# Patient Record
Sex: MALE | Race: WHITE | NOT HISPANIC OR LATINO | URBAN - METROPOLITAN AREA
[De-identification: names, ages, dates, MRNs, and addresses within clinical notes are randomized per-mention and may not be internally consistent; named-entity substitution may affect disease eponyms.]

---

## 2019-01-01 ENCOUNTER — EMERGENCY (EMERGENCY)
Facility: HOSPITAL | Age: 79
LOS: 1 days | Discharge: HOME | End: 2019-01-01
Attending: EMERGENCY MEDICINE
Payer: MEDICARE

## 2019-01-01 VITALS — HEIGHT: 69 IN | WEIGHT: 205.03 LBS

## 2019-01-01 VITALS
SYSTOLIC BLOOD PRESSURE: 124 MMHG | TEMPERATURE: 98 F | RESPIRATION RATE: 19 BRPM | HEART RATE: 74 BPM | DIASTOLIC BLOOD PRESSURE: 85 MMHG | OXYGEN SATURATION: 93 %

## 2019-01-01 DIAGNOSIS — Z90.49 ACQUIRED ABSENCE OF OTHER SPECIFIED PARTS OF DIGESTIVE TRACT: Chronic | ICD-10-CM

## 2019-01-01 DIAGNOSIS — Z98.890 OTHER SPECIFIED POSTPROCEDURAL STATES: Chronic | ICD-10-CM

## 2019-01-01 LAB
ALBUMIN SERPL ELPH-MCNC: 3.5 G/DL — SIGNIFICANT CHANGE UP (ref 3.3–5.2)
ALP SERPL-CCNC: 98 U/L — SIGNIFICANT CHANGE UP (ref 40–120)
ALT FLD-CCNC: 10 U/L — SIGNIFICANT CHANGE UP
ANION GAP SERPL CALC-SCNC: 12 MMOL/L — SIGNIFICANT CHANGE UP (ref 5–17)
APPEARANCE UR: CLEAR — SIGNIFICANT CHANGE UP
AST SERPL-CCNC: 14 U/L — SIGNIFICANT CHANGE UP
BACTERIA # UR AUTO: NEGATIVE — SIGNIFICANT CHANGE UP
BASOPHILS # BLD AUTO: 0.07 K/UL — SIGNIFICANT CHANGE UP (ref 0–0.2)
BASOPHILS NFR BLD AUTO: 0.6 % — SIGNIFICANT CHANGE UP (ref 0–2)
BILIRUB SERPL-MCNC: 0.5 MG/DL — SIGNIFICANT CHANGE UP (ref 0.4–2)
BILIRUB UR-MCNC: NEGATIVE — SIGNIFICANT CHANGE UP
BUN SERPL-MCNC: 19 MG/DL — SIGNIFICANT CHANGE UP (ref 8–20)
CALCIUM SERPL-MCNC: 8.6 MG/DL — SIGNIFICANT CHANGE UP (ref 8.6–10.2)
CHLORIDE SERPL-SCNC: 100 MMOL/L — SIGNIFICANT CHANGE UP (ref 98–107)
CO2 SERPL-SCNC: 25 MMOL/L — SIGNIFICANT CHANGE UP (ref 22–29)
COLOR SPEC: YELLOW — SIGNIFICANT CHANGE UP
CREAT SERPL-MCNC: 0.97 MG/DL — SIGNIFICANT CHANGE UP (ref 0.5–1.3)
CULTURE RESULTS: SIGNIFICANT CHANGE UP
DIFF PNL FLD: ABNORMAL
EOSINOPHIL # BLD AUTO: 0.13 K/UL — SIGNIFICANT CHANGE UP (ref 0–0.5)
EOSINOPHIL NFR BLD AUTO: 1.2 % — SIGNIFICANT CHANGE UP (ref 0–6)
EPI CELLS # UR: NEGATIVE — SIGNIFICANT CHANGE UP
GLUCOSE SERPL-MCNC: 279 MG/DL — HIGH (ref 70–115)
GLUCOSE UR QL: 1000 MG/DL
HCT VFR BLD CALC: 47.2 % — SIGNIFICANT CHANGE UP (ref 39–50)
HGB BLD-MCNC: 15.3 G/DL — SIGNIFICANT CHANGE UP (ref 13–17)
IMM GRANULOCYTES NFR BLD AUTO: 0.4 % — SIGNIFICANT CHANGE UP (ref 0–1.5)
KETONES UR-MCNC: ABNORMAL
LEUKOCYTE ESTERASE UR-ACNC: NEGATIVE — SIGNIFICANT CHANGE UP
LYMPHOCYTES # BLD AUTO: 1.14 K/UL — SIGNIFICANT CHANGE UP (ref 1–3.3)
LYMPHOCYTES # BLD AUTO: 10.3 % — LOW (ref 13–44)
MCHC RBC-ENTMCNC: 28.8 PG — SIGNIFICANT CHANGE UP (ref 27–34)
MCHC RBC-ENTMCNC: 32.4 GM/DL — SIGNIFICANT CHANGE UP (ref 32–36)
MCV RBC AUTO: 88.9 FL — SIGNIFICANT CHANGE UP (ref 80–100)
MONOCYTES # BLD AUTO: 0.91 K/UL — HIGH (ref 0–0.9)
MONOCYTES NFR BLD AUTO: 8.2 % — SIGNIFICANT CHANGE UP (ref 2–14)
NEUTROPHILS # BLD AUTO: 8.82 K/UL — HIGH (ref 1.8–7.4)
NEUTROPHILS NFR BLD AUTO: 79.3 % — HIGH (ref 43–77)
NITRITE UR-MCNC: NEGATIVE — SIGNIFICANT CHANGE UP
PH UR: 5 — SIGNIFICANT CHANGE UP (ref 5–8)
PLATELET # BLD AUTO: 173 K/UL — SIGNIFICANT CHANGE UP (ref 150–400)
POTASSIUM SERPL-MCNC: 4.2 MMOL/L — SIGNIFICANT CHANGE UP (ref 3.5–5.3)
POTASSIUM SERPL-SCNC: 4.2 MMOL/L — SIGNIFICANT CHANGE UP (ref 3.5–5.3)
PROT SERPL-MCNC: 6.5 G/DL — LOW (ref 6.6–8.7)
PROT UR-MCNC: 500 MG/DL
RBC # BLD: 5.31 M/UL — SIGNIFICANT CHANGE UP (ref 4.2–5.8)
RBC # FLD: 13.6 % — SIGNIFICANT CHANGE UP (ref 10.3–14.5)
RBC CASTS # UR COMP ASSIST: SIGNIFICANT CHANGE UP /HPF (ref 0–4)
SODIUM SERPL-SCNC: 137 MMOL/L — SIGNIFICANT CHANGE UP (ref 135–145)
SP GR SPEC: 1.02 — SIGNIFICANT CHANGE UP (ref 1.01–1.02)
SPECIMEN SOURCE: SIGNIFICANT CHANGE UP
UROBILINOGEN FLD QL: NEGATIVE MG/DL — SIGNIFICANT CHANGE UP
WBC # BLD: 11.11 K/UL — HIGH (ref 3.8–10.5)
WBC # FLD AUTO: 11.11 K/UL — HIGH (ref 3.8–10.5)
WBC UR QL: SIGNIFICANT CHANGE UP

## 2019-01-01 PROCEDURE — 70450 CT HEAD/BRAIN W/O DYE: CPT | Mod: 26

## 2019-01-01 PROCEDURE — 93010 ELECTROCARDIOGRAM REPORT: CPT

## 2019-01-01 PROCEDURE — 81001 URINALYSIS AUTO W/SCOPE: CPT

## 2019-01-01 PROCEDURE — 70450 CT HEAD/BRAIN W/O DYE: CPT

## 2019-01-01 PROCEDURE — 80053 COMPREHEN METABOLIC PANEL: CPT

## 2019-01-01 PROCEDURE — 36415 COLL VENOUS BLD VENIPUNCTURE: CPT

## 2019-01-01 PROCEDURE — 99284 EMERGENCY DEPT VISIT MOD MDM: CPT

## 2019-01-01 PROCEDURE — 85027 COMPLETE CBC AUTOMATED: CPT

## 2019-01-01 PROCEDURE — 99284 EMERGENCY DEPT VISIT MOD MDM: CPT | Mod: 25

## 2019-01-01 PROCEDURE — 93005 ELECTROCARDIOGRAM TRACING: CPT

## 2019-01-01 PROCEDURE — 87086 URINE CULTURE/COLONY COUNT: CPT

## 2019-07-03 ENCOUNTER — INPATIENT (INPATIENT)
Facility: HOSPITAL | Age: 79
LOS: 0 days | Discharge: HOME | DRG: 309 | End: 2019-07-04
Attending: STUDENT IN AN ORGANIZED HEALTH CARE EDUCATION/TRAINING PROGRAM | Admitting: STUDENT IN AN ORGANIZED HEALTH CARE EDUCATION/TRAINING PROGRAM
Payer: MEDICARE

## 2019-07-03 VITALS
SYSTOLIC BLOOD PRESSURE: 94 MMHG | WEIGHT: 199.96 LBS | HEIGHT: 68 IN | RESPIRATION RATE: 18 BRPM | HEART RATE: 76 BPM | DIASTOLIC BLOOD PRESSURE: 64 MMHG | OXYGEN SATURATION: 94 %

## 2019-07-03 DIAGNOSIS — N17.9 ACUTE KIDNEY FAILURE, UNSPECIFIED: ICD-10-CM

## 2019-07-03 DIAGNOSIS — D72.829 ELEVATED WHITE BLOOD CELL COUNT, UNSPECIFIED: ICD-10-CM

## 2019-07-03 DIAGNOSIS — I48.4 ATYPICAL ATRIAL FLUTTER: ICD-10-CM

## 2019-07-03 DIAGNOSIS — I49.9 CARDIAC ARRHYTHMIA, UNSPECIFIED: ICD-10-CM

## 2019-07-03 DIAGNOSIS — F41.9 ANXIETY DISORDER, UNSPECIFIED: ICD-10-CM

## 2019-07-03 DIAGNOSIS — I25.10 ATHEROSCLEROTIC HEART DISEASE OF NATIVE CORONARY ARTERY WITHOUT ANGINA PECTORIS: ICD-10-CM

## 2019-07-03 DIAGNOSIS — I10 ESSENTIAL (PRIMARY) HYPERTENSION: ICD-10-CM

## 2019-07-03 DIAGNOSIS — E11.9 TYPE 2 DIABETES MELLITUS WITHOUT COMPLICATIONS: ICD-10-CM

## 2019-07-03 DIAGNOSIS — E78.5 HYPERLIPIDEMIA, UNSPECIFIED: ICD-10-CM

## 2019-07-03 DIAGNOSIS — Z29.9 ENCOUNTER FOR PROPHYLACTIC MEASURES, UNSPECIFIED: ICD-10-CM

## 2019-07-03 LAB
ALBUMIN SERPL ELPH-MCNC: 3.3 G/DL — SIGNIFICANT CHANGE UP (ref 3.3–5)
ALP SERPL-CCNC: 103 U/L — SIGNIFICANT CHANGE UP (ref 40–120)
ALT FLD-CCNC: 18 U/L — SIGNIFICANT CHANGE UP (ref 12–78)
ANION GAP SERPL CALC-SCNC: 4 MMOL/L — LOW (ref 5–17)
APTT BLD: 30.7 SEC — SIGNIFICANT CHANGE UP (ref 27.5–36.3)
AST SERPL-CCNC: 12 U/L — LOW (ref 15–37)
BILIRUB SERPL-MCNC: 0.6 MG/DL — SIGNIFICANT CHANGE UP (ref 0.2–1.2)
BUN SERPL-MCNC: 22 MG/DL — SIGNIFICANT CHANGE UP (ref 7–23)
CALCIUM SERPL-MCNC: 8.5 MG/DL — SIGNIFICANT CHANGE UP (ref 8.5–10.1)
CHLORIDE SERPL-SCNC: 107 MMOL/L — SIGNIFICANT CHANGE UP (ref 96–108)
CO2 SERPL-SCNC: 27 MMOL/L — SIGNIFICANT CHANGE UP (ref 22–31)
CREAT SERPL-MCNC: 1.4 MG/DL — HIGH (ref 0.5–1.3)
GLUCOSE SERPL-MCNC: 258 MG/DL — HIGH (ref 70–99)
HCT VFR BLD CALC: 51.3 % — HIGH (ref 39–50)
HGB BLD-MCNC: 16.9 G/DL — SIGNIFICANT CHANGE UP (ref 13–17)
INR BLD: 1.18 RATIO — HIGH (ref 0.88–1.16)
MAGNESIUM SERPL-MCNC: 2.1 MG/DL — SIGNIFICANT CHANGE UP (ref 1.6–2.6)
MCHC RBC-ENTMCNC: 29.2 PG — SIGNIFICANT CHANGE UP (ref 27–34)
MCHC RBC-ENTMCNC: 32.9 GM/DL — SIGNIFICANT CHANGE UP (ref 32–36)
MCV RBC AUTO: 88.6 FL — SIGNIFICANT CHANGE UP (ref 80–100)
NRBC # BLD: 0 /100 WBCS — SIGNIFICANT CHANGE UP (ref 0–0)
PLATELET # BLD AUTO: 195 K/UL — SIGNIFICANT CHANGE UP (ref 150–400)
POTASSIUM SERPL-MCNC: 4.3 MMOL/L — SIGNIFICANT CHANGE UP (ref 3.5–5.3)
POTASSIUM SERPL-SCNC: 4.3 MMOL/L — SIGNIFICANT CHANGE UP (ref 3.5–5.3)
PROT SERPL-MCNC: 6.9 G/DL — SIGNIFICANT CHANGE UP (ref 6–8.3)
PROTHROM AB SERPL-ACNC: 13.4 SEC — HIGH (ref 10–12.9)
RBC # BLD: 5.79 M/UL — SIGNIFICANT CHANGE UP (ref 4.2–5.8)
RBC # FLD: 14.4 % — SIGNIFICANT CHANGE UP (ref 10.3–14.5)
SODIUM SERPL-SCNC: 138 MMOL/L — SIGNIFICANT CHANGE UP (ref 135–145)
TROPONIN I SERPL-MCNC: <.015 NG/ML — SIGNIFICANT CHANGE UP (ref 0.01–0.04)
WBC # BLD: 14.83 K/UL — HIGH (ref 3.8–10.5)
WBC # FLD AUTO: 14.83 K/UL — HIGH (ref 3.8–10.5)

## 2019-07-03 PROCEDURE — 99223 1ST HOSP IP/OBS HIGH 75: CPT | Mod: GC,AI

## 2019-07-03 PROCEDURE — 93010 ELECTROCARDIOGRAM REPORT: CPT

## 2019-07-03 PROCEDURE — 71045 X-RAY EXAM CHEST 1 VIEW: CPT | Mod: 26

## 2019-07-03 PROCEDURE — 99223 1ST HOSP IP/OBS HIGH 75: CPT

## 2019-07-03 PROCEDURE — 99285 EMERGENCY DEPT VISIT HI MDM: CPT

## 2019-07-03 RX ORDER — DEXTROSE 50 % IN WATER 50 %
12.5 SYRINGE (ML) INTRAVENOUS ONCE
Refills: 0 | Status: DISCONTINUED | OUTPATIENT
Start: 2019-07-03 | End: 2019-07-04

## 2019-07-03 RX ORDER — DEXTROSE 50 % IN WATER 50 %
15 SYRINGE (ML) INTRAVENOUS ONCE
Refills: 0 | Status: DISCONTINUED | OUTPATIENT
Start: 2019-07-03 | End: 2019-07-04

## 2019-07-03 RX ORDER — AMLODIPINE BESYLATE 2.5 MG/1
10 TABLET ORAL DAILY
Refills: 0 | Status: DISCONTINUED | OUTPATIENT
Start: 2019-07-03 | End: 2019-07-04

## 2019-07-03 RX ORDER — ENOXAPARIN SODIUM 100 MG/ML
90 INJECTION SUBCUTANEOUS
Refills: 0 | Status: DISCONTINUED | OUTPATIENT
Start: 2019-07-03 | End: 2019-07-04

## 2019-07-03 RX ORDER — CLOPIDOGREL BISULFATE 75 MG/1
75 TABLET, FILM COATED ORAL DAILY
Refills: 0 | Status: DISCONTINUED | OUTPATIENT
Start: 2019-07-04 | End: 2019-07-04

## 2019-07-03 RX ORDER — GLUCAGON INJECTION, SOLUTION 0.5 MG/.1ML
1 INJECTION, SOLUTION SUBCUTANEOUS ONCE
Refills: 0 | Status: DISCONTINUED | OUTPATIENT
Start: 2019-07-03 | End: 2019-07-04

## 2019-07-03 RX ORDER — INSULIN LISPRO 100/ML
VIAL (ML) SUBCUTANEOUS
Refills: 0 | Status: DISCONTINUED | OUTPATIENT
Start: 2019-07-03 | End: 2019-07-04

## 2019-07-03 RX ORDER — ATORVASTATIN CALCIUM 80 MG/1
20 TABLET, FILM COATED ORAL AT BEDTIME
Refills: 0 | Status: DISCONTINUED | OUTPATIENT
Start: 2019-07-03 | End: 2019-07-04

## 2019-07-03 RX ORDER — INSULIN GLARGINE 100 [IU]/ML
40 INJECTION, SOLUTION SUBCUTANEOUS AT BEDTIME
Refills: 0 | Status: DISCONTINUED | OUTPATIENT
Start: 2019-07-03 | End: 2019-07-04

## 2019-07-03 RX ORDER — DEXTROSE 50 % IN WATER 50 %
25 SYRINGE (ML) INTRAVENOUS ONCE
Refills: 0 | Status: DISCONTINUED | OUTPATIENT
Start: 2019-07-03 | End: 2019-07-04

## 2019-07-03 RX ORDER — SODIUM CHLORIDE 9 MG/ML
1000 INJECTION INTRAMUSCULAR; INTRAVENOUS; SUBCUTANEOUS ONCE
Refills: 0 | Status: COMPLETED | OUTPATIENT
Start: 2019-07-03 | End: 2019-07-03

## 2019-07-03 RX ORDER — LANOLIN ALCOHOL/MO/W.PET/CERES
3 CREAM (GRAM) TOPICAL AT BEDTIME
Refills: 0 | Status: DISCONTINUED | OUTPATIENT
Start: 2019-07-03 | End: 2019-07-04

## 2019-07-03 RX ORDER — INSULIN LISPRO 100/ML
VIAL (ML) SUBCUTANEOUS AT BEDTIME
Refills: 0 | Status: DISCONTINUED | OUTPATIENT
Start: 2019-07-03 | End: 2019-07-04

## 2019-07-03 RX ORDER — SODIUM CHLORIDE 9 MG/ML
1000 INJECTION, SOLUTION INTRAVENOUS
Refills: 0 | Status: DISCONTINUED | OUTPATIENT
Start: 2019-07-03 | End: 2019-07-04

## 2019-07-03 RX ADMIN — ENOXAPARIN SODIUM 90 MILLIGRAM(S): 100 INJECTION SUBCUTANEOUS at 22:36

## 2019-07-03 RX ADMIN — SODIUM CHLORIDE 1000 MILLILITER(S): 9 INJECTION INTRAMUSCULAR; INTRAVENOUS; SUBCUTANEOUS at 18:00

## 2019-07-03 RX ADMIN — AMLODIPINE BESYLATE 10 MILLIGRAM(S): 2.5 TABLET ORAL at 22:28

## 2019-07-03 RX ADMIN — ATORVASTATIN CALCIUM 20 MILLIGRAM(S): 80 TABLET, FILM COATED ORAL at 22:29

## 2019-07-03 RX ADMIN — SODIUM CHLORIDE 1000 MILLILITER(S): 9 INJECTION INTRAMUSCULAR; INTRAVENOUS; SUBCUTANEOUS at 17:00

## 2019-07-03 NOTE — ED PROVIDER NOTE - CONSTITUTIONAL, MLM
normal... Well appearing, elderly white male well nourished, awake, alert, oriented to person, place, time/situation and in no apparent distress.

## 2019-07-03 NOTE — H&P ADULT - PROBLEM SELECTOR PLAN 4
-Continue home medications Losartan 50 and HTZC 25   JUSTIN.... -Home Lantus 80 at bedtime and humalog 5 TID premeal   -Will start Lantus 40 at bedtime, low dose ISS, hypoglymcemia protocol for now -Home Lantus 80 at bedtime and Humalog 5 TID premeal   -Will start Lantus 40 at bedtime, low dose ISS, hypoglycemia protocol for now

## 2019-07-03 NOTE — ED ADULT NURSE NOTE - OBJECTIVE STATEMENT
78 year old male brought in by EMS status post syncope. Patient staying in a hotel nearby for a wedding tonight. Patient reports he has had diet changes. Today he was having a bowel movement, more than normal, and experienced a vasovagal episode. Patient states he was sitting on the toilet and did not fall or hit his head. Patient ambulated to the [facility] where his family was waiting and he had a second episode, this time near syncopal. Family called EMS. On arrival to ED patient is alert and oriented. Patient following commands appropriately. Patient denies chest pain, shortness of breath or palpitations. Patient denies headache, dizziness or blurred vision. Patient denies abdominal pain, nausea/vomiting. Upon arrival to ED EKG obtained. Patient to be found in Atrial Flutter with no known history of an irregular heart rhythm. Patient reports leading up to today he felt his normal self.

## 2019-07-03 NOTE — CHART NOTE - NSCHARTNOTEFT_GEN_A_CORE
Search Terms: Saw Tate, 1940     Search Date: 07/03/2019 08:54:58 PM     The Drug Utilization Report below displays all of the controlled substance prescriptions, if any, that your patient has filled in the last twelve months. The information displayed on this report is compiled from pharmacy submissions to the Department, and accurately reflects the information as submitted by the pharmacies.    This report was requested by: Shon Mueller | Reference #: [id number]             Others' Prescriptions    Patient Name: Ronan Tate YOB: 1940   Address: [address] [place] [address] Sex: Male         Rx Written    Rx Dispensed    Drug    Quantity    Days Supply    Prescriber Name              06/25/2019 06/29/2019 diazepam 5 mg tablet  30 30 Balot, Benedict H DO     05/30/2019 06/03/2019 diazepam 5 mg tablet  30 30 Balot, Benedict H DO     04/26/2019 04/29/2019 diazepam 5 mg tablet  30 30 Balot, Benedict H DO     03/25/2019 03/28/2019 diazepam 5 mg tablet  30 30 Balot, Benedict H DO     02/21/2019 02/22/2019 diazepam 5 mg tablet  30 30 Balot, Benedict H DO     01/17/2019 01/22/2019 diazepam 5 mg tablet  30 30 Balot, Benedict H DO     11/30/2018 12/05/2018 diazepam 5 mg tablet  30 30 Balot, Benedict H DO     10/26/2018 11/03/2018 diazepam 5 mg tablet  30 30 Balot, Benedict H DO     09/27/2018 10/05/2018 diazepam 5 mg tablet  30 30 Balot, Benedict H DO     07/27/2018 08/01/2018 diazepam 5 mg tablet  30 30 Balot, Benedict H DO

## 2019-07-03 NOTE — CONSULT NOTE ADULT - ASSESSMENT
Mr. Tate is a 78 year old male with CAD, HTN and DM2, here with an episode of weakness and near syncope, after going to the bathroom. He blames all of this on eating a lot of fruit, dehydration and diarrhea.  He reports a history of CAD, and had an atherectomy about 6 years ago, though no stents. He is currently on Plavix now, though cannot recall any of his other medications.  He has no history of atrial fibrillation or flutter, and has not been on anticoagulation.    Upon presentation today, he is in a coarse atrial fibrillation vs atrial flutter with generally slow rates. The QRS morphology also has an intermittent LBBB.    - It is unclear to me if his nichelle-atrial arrythmia is related to his symptoms or coincidental. His labs do suggest dehydration so the event could be vagal in the setting of this.  - That being said, he should be admitted to watch on telemetry overnight.  - Check echocardiogram  - A medication list shows he has been on Toprol XL, though this was from 2013. If he remains on av nodals, they should be held.  - In the setting of this new AF/flutter, we had a long discussion regarding a/c. His CHADS2-vasc is elevated, and he should be on eliquis. He does not want to start this without speaking to his cardiologist. He also has been wobbly on his feet, so the risk of fall needs to be evaluated. For now, we can put him on full dose Lovenox until a decision has been made.  - No sign of acute ischemia. His troponin is negative  - No sign of congestive heart failure or volume overload  - Watch creatinine and electrolytes. Keep K>4, mg>2  - Will follow with you while admitted.

## 2019-07-03 NOTE — ED PROVIDER NOTE - CARE PLAN
Principal Discharge DX:	Atypical atrial flutter  Secondary Diagnosis:	Syncope, unspecified syncope type

## 2019-07-03 NOTE — H&P ADULT - PROBLEM SELECTOR PLAN 5
-atherectomy about 6 years ago   -continue home medication Plavix -Continue home medications Losartan 50 and HTZC 25   JUSTIN.... -Hold home medications Losartan 50 and HTZC 25 in setting of JUSTIN with unknown baseline   -Will start amlodipine 10 daily first dose now

## 2019-07-03 NOTE — H&P ADULT - ATTENDING COMMENTS
I personally conducted a physical examination of the patient. I personally gathered the patient's history. I edited the above listed findings which were prepared by the listed resident physician. I personally discussed the plan of care with the patient. The questions and concerns were addressed to the best of my ability. The patient is in agreement with the listed treatment plan.     - plan as above. hold all aaron blocking agents as pt is nichelle w/ new onset aflutter. therapeutic AC. pt is not receptive to continuing long term AC but agreeable w/ injection for now

## 2019-07-03 NOTE — H&P ADULT - PROBLEM SELECTOR PLAN 3
-Home Lantus 80 at bedtime and humalog 5 TID premeal   -Will start Lantus 40 at bedtime, low dose ISS, hypoglymcemia protocol for now -Likely reactive in nature, patient with episode of loose stool this AM prior to admission, but no more loose stools, unlikely infectious  -afebrile   -F/u AM CBC -Likely reactive in nature, patient with episode of loose stool this AM prior to admission, but no more loose stools, unlikely infectious  -afebrile, benign abdominal exam   -F/u AM CBC -Likely reactive in nature, patient with episode of loose stool this AM prior to admission, but no more loose stools, unlikely infectious  -afebrile, benign abdominal exam  -F/u AM CBC

## 2019-07-03 NOTE — H&P ADULT - HISTORY OF PRESENT ILLNESS
78 year old male who is a poor historian with a past medical history of CAD previous atherectomy about 6 years ago, diabetes, HTN, presented to the ED from [facility] with an episode of weakness, fatigue and near syncope?, after going to the bathroom. .No family at bedside at time of history. History obtained from chart and patient. Patient states that he has  recently been on a high fruit diet and was in his normal state of health this morning. This afternoon while at the [facility] he went to the mens room and had a massive amount of diarrhea. Immediately following the diarrheal episode he felt very weak and tired. He managed to walk out of the bathroom and took rest on a nearby couch. He continued to defecate when on the couch because he was too weak to get back to the bathroom. [facility] staff called an ambulance and he was brought to the [place] ED. The patient denies losing consciousness, denies chest pain, SOB, headaches, fevers, chills, diarrhea before today, palpitations headaches, focal weakness, or any other symptoms.      ED Vitals: T: 97.6, HR: 55, BP: 94/64 then 171/79, RR: 16, 100 RA   Labs significant for:   WBC: 14.83, INR: 1.18, Cr: 1.4, glucose: 258, first trop negative     In the Ed patient was given: IVF bolus X1 78 year old male who is a poor historian with a past medical history of CAD previous atherectomy about 6 years ago, diabetes, HTN, presented to the ED from [facility] with an episode of weakness, fatigue and near syncope?, after going to the bathroom. .No family at bedside at time of history. History obtained from chart and patient. Patient states that he has  recently been on a high fruit diet and was in his normal state of health this morning. This afternoon while at the [facility] he went to the mens room and had a massive amount of diarrhea. Immediately following the diarrheal episode he felt very weak and tired. He managed to walk out of the bathroom and took rest on a nearby couch. He continued to defecate when on the couch because he was too weak to get back to the bathroom. [facility] staff called an ambulance and he was brought to the [place] ED. The patient denies losing consciousness, denies chest pain, SOB, headaches, fevers, chills, diarrhea before today, palpitations headaches, focal weakness, or any other symptoms.      ED Vitals: T: 97.6, HR: 55, BP: 94/64 then 171/79, RR: 16, 100 RA   Labs significant for:   WBC: 14.83, INR: 1.18, Cr: 1.4, glucose: 258, first trop negative     In the Ed patient was given: IVF bolus X1   Upon presentation to ED, patient in coarse atrial fibrillation vs atrial flutter with generally slow rates. The QRS morphology also has an intermittent LBBB. 78 year old male who is a poor historian with a past medical history of CAD previous atherectomy about 6 years ago, diabetes, HTN, presented to the ED from [facility] with an episode of weakness, fatigue and near syncope?, after going to the bathroom. No family at bedside at time of history. History obtained from chart and patient. Patient states that he has recently been on a high fruit diet and was in his normal state of health this morning. This afternoon he went to the mens room and had a "massive" amount of diarrhea. Immediately following the diarrheal episode he felt very weak and tired. He managed to walk out of the bathroom and took rest on a nearby couch. He continued to defecate when on the couch because he was too weak to get back to the bathroom. [facility] staff called an ambulance and he was brought to the [place] ED. The patient denies losing consciousness, denies chest pain, SOB, headaches, fevers, chills, diarrhea before today, palpitations headaches, focal weakness, or any other symptoms. At the time of admission, the pt was quite dismissive of his symptoms and he feels that admission is unnecessary.     ED Vitals: T: 97.6, HR: 55, BP: 94/64 then 171/79, RR: 16, 100 RA   Labs significant for:   WBC: 14.83, INR: 1.18, Cr: 1.4, glucose: 258, first trop negative     In the Ed patient was given: IVF bolus X1   Upon presentation to ED, patient in coarse atrial fibrillation vs atrial flutter with generally slow rates. The QRS morphology also has an intermittent LBBB.

## 2019-07-03 NOTE — ED PROVIDER NOTE - CLINICAL SUMMARY MEDICAL DECISION MAKING FREE TEXT BOX
Elderly white male with syncope post diarrhea now in A. Flutter requiring evaluation, labs, ecg and cardiology consultation.

## 2019-07-03 NOTE — ED ADULT NURSE NOTE - NSIMPLEMENTINTERV_GEN_ALL_ED
Implemented All Fall with Harm Risk Interventions:  [place] to call system. Call bell, personal items and telephone within reach. Instruct patient to call for assistance. Room bathroom lighting operational. Non-slip footwear when patient is off stretcher. Physically safe environment: no spills, clutter or unnecessary equipment. Stretcher in lowest position, wheels locked, appropriate side rails in place. Provide visual cue, wrist band, yellow gown, etc. Monitor gait and stability. Monitor for mental status changes and reorient to person, place, and time. Review medications for side effects contributing to fall risk. Reinforce activity limits and safety measures with patient and family. Provide visual clues: red socks.

## 2019-07-03 NOTE — H&P ADULT - PROBLEM SELECTOR PLAN 1
-Patient presented with weakness, ?near syncope, after using the bathroom   -coarse atrial fibrillation vs atrial flutter with generally slow rates, QRS morphology also has an intermittent LBBB on presentation  -Admit to Tele for monitoring   -Seen by Cardio (Dr. Boles) in the ED   -Will start full dose Lovenox for now  -Home metoprolol succinate ER 50?________ -Patient presented with weakness, ?near syncope, after using the bathroom   -coarse atrial fibrillation vs atrial flutter with generally slow rates, QRS morphology also has an intermittent LBBB on presentation  -Admit to Tele for monitoring   -echo ordered, f/u results   -Seen by Cardio (Dr. Boles) in the ED   -Will start full dose Lovenox for now  -Home metoprolol succinate ER 50?________ -Patient presented with weakness, ?near syncope, after using the bathroom   -coarse atrial fibrillation vs atrial flutter with generally slow rates, QRS morphology also has an intermittent LBBB on presentation  -Admit to Tele for monitoring   -First troponin negative  -echo ordered, f/u results   -Seen by Cardio (Dr. Boles) in the ED   -Will start full dose Lovenox for now  -Holding home metoprolol succinate ER 50 -Patient presented with weakness, ?near syncope, after using the bathroom   -coarse atrial fibrillation vs atrial flutter with generally slow rates, QRS morphology also has an intermittent LBBB on presentation  -Admit to Tele for monitoring   -First troponin negative  -echo ordered, f/u results   -Seen by Cardio (Dr. Boles) in the ED   -Will start full dose Lovenox for now for therapeutic AC  -Holding home metoprolol succinate ER 50  -[id number] score >1

## 2019-07-03 NOTE — ED ADULT NURSE REASSESSMENT NOTE - NS ED NURSE REASSESS COMMENT FT1
Plan for tele admission. Patient made aware. Awaiting admission assessment and orders. Awaiting bed assignment. Safety maintained.

## 2019-07-03 NOTE — ED PROVIDER NOTE - OBJECTIVE STATEMENT
78 year old male with a past medical history of coronary artery disease and diabetes presented to the ED with weakness and fatigue. The patient has recently been on a high fruit diet and was in his normal state of health this morning. This afternoon while at the [facility] he went to the mens room and had a massive amount of diarrhea. Immediately following the diarrheal episode he felt very weak and tired. He managed to walk out of the bathroom and took rest on a nearby couch. He continued to defecate when on the couch because he was too weak to get back to the bathroom. [facility] and he was brought to the [place] ED. The patient denies losing consciousness, denies chest pain, and denies palpitations. The patient denies having a history of an irregular heart rhythm.

## 2019-07-03 NOTE — H&P ADULT - PROBLEM SELECTOR PLAN 7
-On Full Dose Lovenox for now     BB IMPROVE VTE Individual Risk Assessment          RISK                                                          Points    [  ] Previous VTE                                                3  [  ] Thrombophilia                                             2  [  ] Lower limb paralysis                                   2        (unable to hold up >15 seconds)    [  ] Current Cancer                                            2         (within 6 months)  [  ] Immobilization > 24 hrs                              1  [  ] ICU/CCU stay > 24 hours                            1  [1  ] Age > 60                                                    1    IMPROVE VTE Score ____1_____ -atherectomy about 6 years ago   -continue home medication Plavix

## 2019-07-03 NOTE — ED ADULT NURSE NOTE - CHPI ED NUR SYMPTOMS NEG
no nausea/no chest pain/no diaphoresis/no fever/no syncope/no vomiting/no congestion/no chills/no back pain/no shortness of breath/no dizziness

## 2019-07-03 NOTE — CHART NOTE - NSCHARTNOTEFT_GEN_A_CORE
Called by RN for 4 beats of Vtach. Patient asymptomatic, talking on the phone. Will add magnesium and phos level to AM labs. Cardio, Dr. Boles on board.

## 2019-07-03 NOTE — H&P ADULT - NSHPPHYSICALEXAM_GEN_ALL_CORE
General: Well developed, well nourished, NAD  HEENT: PERRL  Neurology: alert, awake, nonfocal, 4-5/5 strength b/l LE   Respiratory: CTA B/L, No W/R/R  CV: nichelle, +S1/S2, no murmurs, rubs or gallops  Abdominal: Soft, NT, ND +BS  Extremities: No C/C/E  Skin: warm, dry General: well nourished, NAD  HEENT: PERRL, sclera clear  Neurology: alert, awake, nonfocal, 4-5/5 strength b/l LE   Respiratory: CTA B/L, No W/R/R  CV: nichelle, soft S1/S2, no murmurs, rubs or gallops, irregular rhythm  Abdominal: Soft, NT, ND active BS  Extremities: No C/C/E  Skin: warm, dry

## 2019-07-03 NOTE — ED ADULT TRIAGE NOTE - CHIEF COMPLAINT QUOTE
per EMS and patient, patient was having BM and had vasovagal episode. After that, had near syncopal episode.

## 2019-07-03 NOTE — H&P ADULT - NSHPLABSRESULTS_GEN_ALL_CORE
16.9   14.83 )-----------( 195      ( 03 Jul 2019 17:10 )             51.3       07-03    138  |  107  |  22  ----------------------------<  258<H>  4.3   |  27  |  1.40<H>    Ca    8.5      03 Jul 2019 17:10  Mg     2.1     07-03    TPro  6.9  /  Alb  3.3  /  TBili  0.6  /  DBili  x   /  AST  12<L>  /  ALT  18  /  AlkPhos  103  07-03

## 2019-07-03 NOTE — H&P ADULT - NSHPREVIEWOFSYSTEMS_GEN_ALL_CORE
CONSTITUTIONAL: No current weakness, fevers or chills  RESPIRATORY: No cough, wheezing, hemoptysis; No shortness of breath  CARDIOVASCULAR: No chest pain or palpitations  GASTROINTESTINAL: No abdominal or epigastric pain. No nausea, vomiting, or hematemesis; + diarrhea  No melena or hematochezia.  GENITOURINARY: No dysuria, frequency or hematuria  NEUROLOGICAL: No numbness or  current weakness  SKIN: No itching, burning, rashes, or lesions   All other review of systems is negative unless indicated above. CONSTITUTIONAL: No current weakness, fevers or chills  RESPIRATORY: No cough, wheezing, hemoptysis; No shortness of breath  CARDIOVASCULAR: No chest pain or palpitations  GASTROINTESTINAL: No abdominal or epigastric pain. No nausea, vomiting, or hematemesis; + diarrhea  No melena or hematochezia.  GENITOURINARY: No dysuria, frequency or hematuria  NEUROLOGICAL: No numbness or  current weakness  SKIN: No itching, burning, rashes, or lesions    LYMPH: Denies lower extremitiy edema. Denies painful lymphadenopathy

## 2019-07-03 NOTE — H&P ADULT - ASSESSMENT
78 year old male who is a poor historian with a past medical history of CAD previous atherectomy about 6 years ago, diabetes, HTN, presented to the ED from [facility] with an episode of weakness, fatigue and near syncope?, after going to the bathroom, found to be in coarse atrial fibrillation vs atrial flutter with generally slow rates, QRS morphology also has an intermittent LBBB on presentation admitted for ?new onset nichelle-atrial arrythmia.

## 2019-07-03 NOTE — ED PROVIDER NOTE - ATTENDING CONTRIBUTION TO CARE
Near syncope post diarrhea. Exam revealed elderly white female in NAD with irregular  rhythm. I agree with plan and management outlined by R-1

## 2019-07-03 NOTE — ED PROVIDER NOTE - CHPI ED SYMPTOMS NEG
no vomiting/no shortness of breath/no back pain/no fever/no diaphoresis/no chills/no nausea/no syncope/no chest pain/no cough

## 2019-07-03 NOTE — H&P ADULT - NSHPSOCIALHISTORY_GEN_ALL_CORE
Lives at home with wife   former smoker, quit 30 years ago, smoked 2-3 PPD for 20 years   Last drink 40 years ago, had "couple drinks" before that   ambulates freely Lives at home with wife   former smoker, quit 30 years ago, smoked 2-3 PPD for 20 years   Last drink 40 years ago, had "couple drinks" before that   ambulates independently

## 2019-07-03 NOTE — H&P ADULT - PROBLEM SELECTOR PLAN 9
-On Full Dose Lovenox for now     BB IMPROVE VTE Individual Risk Assessment          RISK                                                          Points    [  ] Previous VTE                                                3  [  ] Thrombophilia                                             2  [  ] Lower limb paralysis                                   2        (unable to hold up >15 seconds)    [  ] Current Cancer                                            2         (within 6 months)  [  ] Immobilization > 24 hrs                              1  [  ] ICU/CCU stay > 24 hours                            1  [1  ] Age > 60                                                    1    IMPROVE VTE Score ____1_____

## 2019-07-03 NOTE — H&P ADULT - PROBLEM SELECTOR PLAN 2
-Cr 1.4, unknown baseline, likely 2/2 to dehydration   -S/p 1VF bolus X1 in the ED   -Encourage PO intake, DASH TLC diet -Cr 1.4, unknown baseline, likely 2/2 to dehydration   -S/p 1VF bolus X1 in the ED  -Encourage PO intake, DASH TLC diet -Cr 1.4, unknown baseline, likely 2/2 to dehydration   -S/p 1VF bolus X1 in the ED  -Encourage PO intake, DASH TLC diet  -May be CKD, trend renal indices

## 2019-07-03 NOTE — CONSULT NOTE ADULT - SUBJECTIVE AND OBJECTIVE BOX
[facility] - Maxwell Gonzalez, Santa Jaquez, Chandan, Elvi Garnett  Office Number: [phone]    Initial Consult Note    CHIEF COMPLAINT: Patient is a 78y old  Male who presents with a chief complaint of near syncope    HPI:  78 year old male with a past medical history of coronary artery disease and diabetes presented to the ED with weakness and fatigue. The patient has recently been on a high fruit diet and was in his normal state of health this morning. This afternoon while at the [facility] he went to the mens room and had a massive amount of diarrhea. Immediately following the diarrheal episode he felt very weak and tired. He managed to walk out of the bathroom and took rest on a nearby couch. He continued to defecate when on the couch because he was too weak to get back to the bathroom. [facility] and he was brought to the [place] ED. The patient denies losing consciousness, denies chest pain, and denies palpitations. The patient denies having a history of an irregular heart rhythm.    He sees a cardiologist at [facility] though cannot recall his name.   He reports a history of CAD, and had an atherectomy about 6 years ago, though no stents. He is currently on Plavix now, though cannot recall any of his other medications.  He was feeling well until this morning, when he had an episode of weakness while going to the bathroom.  He has no history of atrial fibrillation or flutter, and has not been on anticoagulation.    PAST MEDICAL & SURGICAL HISTORY:  Hyperlipidemia  Hypertension  Diabetes mellitus  No significant past surgical history  CAD      SOCIAL HISTORY:  No tobacco, ethanol, or drug abuse.    FAMILY HISTORY:    No family history of acute MI or sudden cardiac death.    MEDICATIONS  (STANDING):    MEDICATIONS  (PRN):      Allergies    No Known Allergies    Intolerances        REVIEW OF SYSTEMS:    CONSTITUTIONAL: No weakness, fevers or chills  EYES/ENT: No visual changes;  No vertigo or throat pain   NECK: No pain or stiffness  RESPIRATORY: No cough, wheezing, hemoptysis; No shortness of breath  CARDIOVASCULAR: No chest pain or palpitations  GASTROINTESTINAL: No abdominal pain. No nausea, vomiting, or hematemesis; No diarrhea or constipation. No melena or hematochezia.  GENITOURINARY: No dysuria, frequency or hematuria  NEUROLOGICAL: No numbness or weakness  SKIN: No itching or rash  All other review of systems is negative unless indicated above    VITAL SIGNS:   Vital Signs Last 24 Hrs  T(C): 36.4 (03 Jul 2019 18:26), Max: 36.4 (03 Jul 2019 18:26)  T(F): 97.6 (03 Jul 2019 18:26), Max: 97.6 (03 Jul 2019 18:26)  HR: 55 (03 Jul 2019 18:26) (55 - 76)  BP: 171/79 (03 Jul 2019 18:26) (94/64 - 171/79)  BP(mean): --  RR: 16 (03 Jul 2019 18:26) (15 - 18)  SpO2: 100% (03 Jul 2019 18:26) (94% - 100%)    I&O's Summary      On Exam:    Constitutional: NAD, alert and oriented x 3  Lungs:  Non-labored, breath sounds are clear bilaterally, No wheezing, rales or rhonchi  Cardiovascular: irregular and bradycardic, S1 and S2 positive.  No murmurs, rubs, gallops or clicks  Gastrointestinal: Bowel Sounds present, soft, nontender.   Lymph: No peripheral edema. No cervical lymphadenopathy.  Neurological: Alert, no focal deficits  Skin: No rashes or ulcers   Psych:  Mood & affect appropriate.    LABS: All Labs Reviewed:                        16.9   14.83 )-----------( 195      ( 03 Jul 2019 17:10 )             51.3     03 Jul 2019 17:10    138    |  107    |  22     ----------------------------<  258    4.3     |  27     |  1.40     Ca    8.5        03 Jul 2019 17:10  Mg     2.1       03 Jul 2019 17:10    TPro  6.9    /  Alb  3.3    /  TBili  0.6    /  DBili  x      /  AST  12     /  ALT  18     /  AlkPhos  103    03 Jul 2019 17:10    PT/INR - ( 03 Jul 2019 17:10 )   PT: 13.4 sec;   INR: 1.18 ratio         PTT - ( 03 Jul 2019 17:10 )  PTT:30.7 sec  CARDIAC MARKERS ( 03 Jul 2019 17:10 )  <.015 ng/mL / x     / x     / x     / x          Blood Culture:         RADIOLOGY:    EKG: coarse AF vs flutter,  morphology of the qrs is changing, with intermittent lbbb

## 2019-07-04 ENCOUNTER — TRANSCRIPTION ENCOUNTER (OUTPATIENT)
Age: 79
End: 2019-07-04

## 2019-07-04 VITALS
SYSTOLIC BLOOD PRESSURE: 125 MMHG | RESPIRATION RATE: 18 BRPM | DIASTOLIC BLOOD PRESSURE: 70 MMHG | OXYGEN SATURATION: 94 % | TEMPERATURE: 98 F | HEART RATE: 63 BPM

## 2019-07-04 LAB
ANION GAP SERPL CALC-SCNC: 7 MMOL/L — SIGNIFICANT CHANGE UP (ref 5–17)
BASOPHILS # BLD AUTO: 0.08 K/UL — SIGNIFICANT CHANGE UP (ref 0–0.2)
BASOPHILS NFR BLD AUTO: 0.8 % — SIGNIFICANT CHANGE UP (ref 0–2)
BUN SERPL-MCNC: 20 MG/DL — SIGNIFICANT CHANGE UP (ref 7–23)
CALCIUM SERPL-MCNC: 7.7 MG/DL — LOW (ref 8.5–10.1)
CHLORIDE SERPL-SCNC: 108 MMOL/L — SIGNIFICANT CHANGE UP (ref 96–108)
CO2 SERPL-SCNC: 28 MMOL/L — SIGNIFICANT CHANGE UP (ref 22–31)
CREAT SERPL-MCNC: 1.1 MG/DL — SIGNIFICANT CHANGE UP (ref 0.5–1.3)
EOSINOPHIL # BLD AUTO: 0.25 K/UL — SIGNIFICANT CHANGE UP (ref 0–0.5)
EOSINOPHIL NFR BLD AUTO: 2.6 % — SIGNIFICANT CHANGE UP (ref 0–6)
GLUCOSE SERPL-MCNC: 152 MG/DL — HIGH (ref 70–99)
HBA1C BLD-MCNC: 7.8 % — HIGH (ref 4–5.6)
HCT VFR BLD CALC: 46.2 % — SIGNIFICANT CHANGE UP (ref 39–50)
HGB BLD-MCNC: 15.1 G/DL — SIGNIFICANT CHANGE UP (ref 13–17)
IMM GRANULOCYTES NFR BLD AUTO: 0.3 % — SIGNIFICANT CHANGE UP (ref 0–1.5)
LYMPHOCYTES # BLD AUTO: 2.29 K/UL — SIGNIFICANT CHANGE UP (ref 1–3.3)
LYMPHOCYTES # BLD AUTO: 24 % — SIGNIFICANT CHANGE UP (ref 13–44)
MAGNESIUM SERPL-MCNC: 1.9 MG/DL — SIGNIFICANT CHANGE UP (ref 1.6–2.6)
MCHC RBC-ENTMCNC: 28.9 PG — SIGNIFICANT CHANGE UP (ref 27–34)
MCHC RBC-ENTMCNC: 32.7 GM/DL — SIGNIFICANT CHANGE UP (ref 32–36)
MCV RBC AUTO: 88.3 FL — SIGNIFICANT CHANGE UP (ref 80–100)
MONOCYTES # BLD AUTO: 0.82 K/UL — SIGNIFICANT CHANGE UP (ref 0–0.9)
MONOCYTES NFR BLD AUTO: 8.6 % — SIGNIFICANT CHANGE UP (ref 2–14)
NEUTROPHILS # BLD AUTO: 6.09 K/UL — SIGNIFICANT CHANGE UP (ref 1.8–7.4)
NEUTROPHILS NFR BLD AUTO: 63.7 % — SIGNIFICANT CHANGE UP (ref 43–77)
NRBC # BLD: 0 /100 WBCS — SIGNIFICANT CHANGE UP (ref 0–0)
PHOSPHATE SERPL-MCNC: 3.2 MG/DL — SIGNIFICANT CHANGE UP (ref 2.5–4.5)
PLATELET # BLD AUTO: 195 K/UL — SIGNIFICANT CHANGE UP (ref 150–400)
POTASSIUM SERPL-MCNC: 3.7 MMOL/L — SIGNIFICANT CHANGE UP (ref 3.5–5.3)
POTASSIUM SERPL-SCNC: 3.7 MMOL/L — SIGNIFICANT CHANGE UP (ref 3.5–5.3)
RBC # BLD: 5.23 M/UL — SIGNIFICANT CHANGE UP (ref 4.2–5.8)
RBC # FLD: 14.2 % — SIGNIFICANT CHANGE UP (ref 10.3–14.5)
SODIUM SERPL-SCNC: 143 MMOL/L — SIGNIFICANT CHANGE UP (ref 135–145)
WBC # BLD: 9.56 K/UL — SIGNIFICANT CHANGE UP (ref 3.8–10.5)
WBC # FLD AUTO: 9.56 K/UL — SIGNIFICANT CHANGE UP (ref 3.8–10.5)

## 2019-07-04 PROCEDURE — 84484 ASSAY OF TROPONIN QUANT: CPT

## 2019-07-04 PROCEDURE — 71045 X-RAY EXAM CHEST 1 VIEW: CPT

## 2019-07-04 PROCEDURE — 83735 ASSAY OF MAGNESIUM: CPT

## 2019-07-04 PROCEDURE — 85610 PROTHROMBIN TIME: CPT

## 2019-07-04 PROCEDURE — 93010 ELECTROCARDIOGRAM REPORT: CPT

## 2019-07-04 PROCEDURE — 80048 BASIC METABOLIC PNL TOTAL CA: CPT

## 2019-07-04 PROCEDURE — 82962 GLUCOSE BLOOD TEST: CPT

## 2019-07-04 PROCEDURE — 36415 COLL VENOUS BLD VENIPUNCTURE: CPT

## 2019-07-04 PROCEDURE — 80053 COMPREHEN METABOLIC PANEL: CPT

## 2019-07-04 PROCEDURE — 84100 ASSAY OF PHOSPHORUS: CPT

## 2019-07-04 PROCEDURE — 99285 EMERGENCY DEPT VISIT HI MDM: CPT | Mod: 25

## 2019-07-04 PROCEDURE — 83036 HEMOGLOBIN GLYCOSYLATED A1C: CPT

## 2019-07-04 PROCEDURE — 85027 COMPLETE CBC AUTOMATED: CPT

## 2019-07-04 PROCEDURE — 93005 ELECTROCARDIOGRAM TRACING: CPT

## 2019-07-04 PROCEDURE — 85730 THROMBOPLASTIN TIME PARTIAL: CPT

## 2019-07-04 PROCEDURE — 99239 HOSP IP/OBS DSCHRG MGMT >30: CPT

## 2019-07-04 RX ADMIN — ENOXAPARIN SODIUM 90 MILLIGRAM(S): 100 INJECTION SUBCUTANEOUS at 09:19

## 2019-07-04 NOTE — PROGRESS NOTE ADULT - SUBJECTIVE AND OBJECTIVE BOX
NP Progress Note     [facility] -- Maxwell Gonzalez, Gisele, Santa, Tamir Hawley Savella  Office # [phone]      Follow Up: Adolfo - Atrial arrythmia     HPI:  78 year old male who is a poor historian with a past medical history of CAD previous atherectomy about 6 years ago, diabetes, HTN, presented to the ED from [facility] with an episode of weakness, fatigue and near syncope?, after going to the bathroom. No family at bedside at time of history. History obtained from chart and patient. Patient states that he has recently been on a high fruit diet and was in his normal state of health this morning. This afternoon he went to the mens room and had a "massive" amount of diarrhea. Immediately following the diarrheal episode he felt very weak and tired. He managed to walk out of the bathroom and took rest on a nearby couch. He continued to defecate when on the couch because he was too weak to get back to the bathroom. [facility] staff called an ambulance and he was brought to the [place] ED. The patient denies losing consciousness, denies chest pain, SOB, headaches, fevers, chills, diarrhea before today, palpitations headaches, focal weakness, or any other symptoms. At the time of admission, the pt was quite dismissive of his symptoms and he feels that admission is unnecessary.     ED Vitals: T: 97.6, HR: 55, BP: 94/64 then 171/79, RR: 16, 100 RA   Labs significant for:   WBC: 14.83, INR: 1.18, Cr: 1.4, glucose: 258, first trop negative     In the Ed patient was given: IVF bolus X1   Upon presentation to ED, patient in coarse atrial fibrillation vs atrial flutter with generally slow rates. The QRS morphology also has an intermittent LBBB. (03 Jul 2019 19:55)        Subjective/Observations: Pt. seen and examined and evaluated. Pt. resting comfortably in bed in NAD, with no respiratory distress, no chest pain, dyspnea, palpitations, PND, or orthopnea.      REVIEW OF SYSTEMS: All other review of systems is negative unless indicated above    PAST MEDICAL & SURGICAL HISTORY:  Hyperlipidemia  Hypertension  Diabetes mellitus  No significant past surgical history      MEDICATIONS  (STANDING):  amLODIPine   Tablet 10 milliGRAM(s) Oral daily  atorvastatin 20 milliGRAM(s) Oral at bedtime  clopidogrel Tablet 75 milliGRAM(s) Oral daily  dextrose 5%. 1000 milliLiter(s) (50 mL/Hr) IV Continuous <Continuous>  dextrose 50% Injectable 12.5 Gram(s) IV Push once  dextrose 50% Injectable 25 Gram(s) IV Push once  dextrose 50% Injectable 25 Gram(s) IV Push once  enoxaparin Injectable 90 milliGRAM(s) SubCutaneous two times a day  insulin glargine Injectable (LANTUS) 40 Unit(s) SubCutaneous at bedtime  insulin lispro (HumaLOG) corrective regimen sliding scale   SubCutaneous three times a day before meals  insulin lispro (HumaLOG) corrective regimen sliding scale   SubCutaneous at bedtime    MEDICATIONS  (PRN):  dextrose 40% Gel 15 Gram(s) Oral once PRN Blood Glucose LESS THAN 70 milliGRAM(s)/deciliter  glucagon  Injectable 1 milliGRAM(s) IntraMuscular once PRN Glucose LESS THAN 70 milligrams/deciliter  melatonin 3 milliGRAM(s) Oral at bedtime PRN Insomnia      Allergies:     No Known Allergies    Intolerances          Vital Signs Last 24 Hrs  T(C): 36.9 (04 Jul 2019 07:25), Max: 37.2 (03 Jul 2019 23:05)  T(F): 98.5 (04 Jul 2019 07:25), Max: 98.9 (03 Jul 2019 23:05)  HR: 63 (04 Jul 2019 07:25) (55 - 76)  BP: 125/70 (04 Jul 2019 07:25) (94/64 - 183/89)  BP(mean): --  RR: 18 (04 Jul 2019 07:25) (15 - 18)  SpO2: 94% (04 Jul 2019 07:25) (92% - 100%)    I&O's Summary    Weight (kg): 90.7 (07-03 @ 15:54)        LABS: All Labs Reviewed:                        15.1   9.56  )-----------( 195      ( 04 Jul 2019 06:24 )             46.2                         16.9   14.83 )-----------( 195      ( 03 Jul 2019 17:10 )             51.3     04 Jul 2019 06:24    143    |  108    |  20     ----------------------------<  152    3.7     |  28     |  1.10   03 Jul 2019 17:10    138    |  107    |  22     ----------------------------<  258    4.3     |  27     |  1.40     Ca    7.7        04 Jul 2019 06:24  Ca    8.5        03 Jul 2019 17:10  Phos  3.2       04 Jul 2019 06:24  Mg     1.9       04 Jul 2019 06:24  Mg     2.1       03 Jul 2019 17:10    TPro  6.9    /  Alb  3.3    /  TBili  0.6    /  DBili  x      /  AST  12     /  ALT  18     /  AlkPhos  103    03 Jul 2019 17:10    PT/INR - ( 03 Jul 2019 17:10 )   PT: 13.4 sec;   INR: 1.18 ratio         PTT - ( 03 Jul 2019 17:10 )  PTT:30.7 sec  CARDIAC MARKERS ( 03 Jul 2019 17:10 )  <.015 ng/mL / x     / x     / x     / x               Echo:    Stress Testing:     Cath:    Imaging:    Interpretation of Telemetry:      Physical Exam:  Appearance: [ ] Normal  [ ] abnormal [ ] NAD   Eyes: [ ] PERRL [ ] EOMI  HEENT: [ ] Normal [ ] Abnormal oral mucosa [ ]NC/AT  Cardiovascular: [ ] S1 [ ] S2 [ ] RRR [ ] m/r/g [ ]edema [ ] JVP  Procedural Access Site: [ ]  hematoma [ ] tender to palpation [ ] 2+ pulse [ ] bruit [ ] Ecchymosis  Respiratory: [ ] Clear to auscultation bilaterally  Gastrointestinal: [ ] Soft [ ] tenderness[ ] distension [ ] BS  Musculoskeletal: [ ] clubbing [ ] joint deformity   Neurologic: [ ] Non-focal  Lymphatic: [ ] lymphadenopathy  Psychiatry: [ ] AAOx3  [ ] confused [ ] disoriented [ ] Mood & affect appropriate  Skin: [ ]  rashes [ ] ecchymoses [ ] cyanosis NP Progress Note     [facility] -- Maxwell Gonzalez, Gisele, Santa, Tamir Hawley Savella  Office # [phone]      Follow Up: Adolfo - Atrial arrythmia     HPI:  78 year old male who is a poor historian with a past medical history of CAD previous atherectomy about 6 years ago, diabetes, HTN, presented to the ED from [facility] with an episode of weakness, fatigue and near syncope?, after going to the bathroom. No family at bedside at time of history. History obtained from chart and patient. Patient states that he has recently been on a high fruit diet and was in his normal state of health this morning. This afternoon he went to the mens room and had a "massive" amount of diarrhea. Immediately following the diarrheal episode he felt very weak and tired. He managed to walk out of the bathroom and took rest on a nearby couch. He continued to defecate when on the couch because he was too weak to get back to the bathroom. [facility] staff called an ambulance and he was brought to the [place] ED. The patient denies losing consciousness, denies chest pain, SOB, headaches, fevers, chills, diarrhea before today, palpitations headaches, focal weakness, or any other symptoms. At the time of admission, the pt was quite dismissive of his symptoms and he feels that admission is unnecessary.     ED Vitals: T: 97.6, HR: 55, BP: 94/64 then 171/79, RR: 16, 100 RA   Labs significant for:   WBC: 14.83, INR: 1.18, Cr: 1.4, glucose: 258, first trop negative     In the Ed patient was given: IVF bolus X1   Upon presentation to ED, patient in coarse atrial fibrillation vs atrial flutter with generally slow rates. The QRS morphology also has an intermittent LBBB. (03 Jul 2019 19:55)        Subjective/Observations: Pt. seen and examined and evaluated. Pt. resting comfortably in bed in NAD, with no respiratory distress, no chest pain, dyspnea, palpitations, PND, or orthopnea.      REVIEW OF SYSTEMS: All other review of systems is negative unless indicated above    PAST MEDICAL & SURGICAL HISTORY:  Hyperlipidemia  Hypertension  Diabetes mellitus  No significant past surgical history      MEDICATIONS  (STANDING):  amLODIPine   Tablet 10 milliGRAM(s) Oral daily  atorvastatin 20 milliGRAM(s) Oral at bedtime  clopidogrel Tablet 75 milliGRAM(s) Oral daily  dextrose 5%. 1000 milliLiter(s) (50 mL/Hr) IV Continuous <Continuous>  dextrose 50% Injectable 12.5 Gram(s) IV Push once  dextrose 50% Injectable 25 Gram(s) IV Push once  dextrose 50% Injectable 25 Gram(s) IV Push once  enoxaparin Injectable 90 milliGRAM(s) SubCutaneous two times a day  insulin glargine Injectable (LANTUS) 40 Unit(s) SubCutaneous at bedtime  insulin lispro (HumaLOG) corrective regimen sliding scale   SubCutaneous three times a day before meals  insulin lispro (HumaLOG) corrective regimen sliding scale   SubCutaneous at bedtime    MEDICATIONS  (PRN):  dextrose 40% Gel 15 Gram(s) Oral once PRN Blood Glucose LESS THAN 70 milliGRAM(s)/deciliter  glucagon  Injectable 1 milliGRAM(s) IntraMuscular once PRN Glucose LESS THAN 70 milligrams/deciliter  melatonin 3 milliGRAM(s) Oral at bedtime PRN Insomnia      Allergies:  No Known Allergies      Vital Signs Last 24 Hrs  T(C): 36.9 (04 Jul 2019 07:25), Max: 37.2 (03 Jul 2019 23:05)  T(F): 98.5 (04 Jul 2019 07:25), Max: 98.9 (03 Jul 2019 23:05)  HR: 63 (04 Jul 2019 07:25) (55 - 76)  BP: 125/70 (04 Jul 2019 07:25) (94/64 - 183/89)  BP(mean): --  RR: 18 (04 Jul 2019 07:25) (15 - 18)  SpO2: 94% (04 Jul 2019 07:25) (92% - 100%)    I&O's Summary    Weight (kg): 90.7 (07-03 @ 15:54)        LABS: All Labs Reviewed:                        15.1   9.56  )-----------( 195      ( 04 Jul 2019 06:24 )             46.2    04 Jul 2019 06:24    143    |  108    |  20     ----------------------------<  152    3.7     |  28     |  1.10     Ca    7.7        04 Jul 2019 06:24  Phos  3.2       04 Jul 2019 06:24  Mg     1.9       04 Jul 2019 06:24    TPro  6.9    /  Alb  3.3    /  TBili  0.6    /  DBili  x      /  AST  12     /  ALT  18     /  AlkPhos  103    03 Jul 2019 17:10    PT/INR - ( 03 Jul 2019 17:10 )   PT: 13.4 sec;   INR: 1.18 ratio         PTT - ( 03 Jul 2019 17:10 )  PTT:30.7 sec  CARDIAC MARKERS ( 03 Jul 2019 17:10 )  <.015 ng/mL / x     / x     / x     / x               Interpretation of Telemetry: Overnight on telemetry afib/flutter 60's 3 beat WCT      Physical Exam:  Appearance: [ ] Normal  [ ] abnormal [ ] NAD   Eyes: [ ] PERRL [ ] EOMI  HEENT: [ ] Normal [ ] Abnormal oral mucosa [ ]NC/AT  Cardiovascular: [ ] S1 [ ] S2 [ ] RRR [ ] m/r/g [ ]edema [ ] JVP  Procedural Access Site: [ ]  hematoma [ ] tender to palpation [ ] 2+ pulse [ ] bruit [ ] Ecchymosis  Respiratory: [ ] Clear to auscultation bilaterally  Gastrointestinal: [ ] Soft [ ] tenderness[ ] distension [ ] BS  Musculoskeletal: [ ] clubbing [ ] joint deformity   Neurologic: [ ] Non-focal  Lymphatic: [ ] lymphadenopathy  Psychiatry: [ ] AAOx3  [ ] confused [ ] disoriented [ ] Mood & affect appropriate  Skin: [ ]  rashes [ ] ecchymoses [ ] cyanosis NP Progress Note     [facility] -- Maxwell Gonzalez, Gisele, Santa, Tamir Hawley Savella  Office # [phone]      Follow Up: Adolfo - Atrial arrythmia     HPI:  78 year old male who is a poor historian with a past medical history of CAD previous atherectomy about 6 years ago, diabetes, HTN, presented to the ED from [facility] ED with an episode of weakness, fatigue and near syncope?, after going to the bathroom. No family at bedside at time of history. History obtained from chart and patient. Patient states that he has recently been on a high fruit diet and was in his normal state of health this morning. This afternoon he went to the mens room and had a "massive" amount of diarrhea. Immediately following the diarrheal episode he felt very weak and tired. He managed to walk out of the bathroom and took rest on a nearby couch. He continued to defecate when on the couch because he was too weak to get back to the bathroom. [facility] staff called an ambulance and he was brought to the [place] ED. The patient denies losing consciousness, denies chest pain, SOB, headaches, fevers, chills, diarrhea before today, palpitations headaches, focal weakness, or any other symptoms. At the time of admission, the pt was quite dismissive of his symptoms and he feels that admission is unnecessary.     ED Vitals: T: 97.6, HR: 55, BP: 94/64 then 171/79, RR: 16, 100 RA   Labs significant for:   WBC: 14.83, INR: 1.18, Cr: 1.4, glucose: 258, first trop negative     In the Ed patient was given: IVF bolus X1   Upon presentation to ED, patient in coarse atrial fibrillation vs atrial flutter with generally slow rates. The QRS morphology also has an intermittent LBBB. (03 Jul 2019 19:55)        Subjective/Observations: Pt. seen and examined and evaluated. Pt. resting comfortably in bed in NAD, with no respiratory distress, no chest pain, dyspnea, palpitations, PND, or orthopnea.      REVIEW OF SYSTEMS: All other review of systems is negative unless indicated above    PAST MEDICAL & SURGICAL HISTORY:  Hyperlipidemia  Hypertension  Diabetes mellitus  No significant past surgical history      MEDICATIONS  (STANDING):  amLODIPine   Tablet 10 milliGRAM(s) Oral daily  atorvastatin 20 milliGRAM(s) Oral at bedtime  clopidogrel Tablet 75 milliGRAM(s) Oral daily  dextrose 5%. 1000 milliLiter(s) (50 mL/Hr) IV Continuous <Continuous>  dextrose 50% Injectable 12.5 Gram(s) IV Push once  dextrose 50% Injectable 25 Gram(s) IV Push once  dextrose 50% Injectable 25 Gram(s) IV Push once  enoxaparin Injectable 90 milliGRAM(s) SubCutaneous two times a day  insulin glargine Injectable (LANTUS) 40 Unit(s) SubCutaneous at bedtime  insulin lispro (HumaLOG) corrective regimen sliding scale   SubCutaneous three times a day before meals  insulin lispro (HumaLOG) corrective regimen sliding scale   SubCutaneous at bedtime    MEDICATIONS  (PRN):  dextrose 40% Gel 15 Gram(s) Oral once PRN Blood Glucose LESS THAN 70 milliGRAM(s)/deciliter  glucagon  Injectable 1 milliGRAM(s) IntraMuscular once PRN Glucose LESS THAN 70 milligrams/deciliter  melatonin 3 milliGRAM(s) Oral at bedtime PRN Insomnia      Allergies:  No Known Allergies      Vital Signs Last 24 Hrs  T(C): 36.9 (04 Jul 2019 07:25), Max: 37.2 (03 Jul 2019 23:05)  T(F): 98.5 (04 Jul 2019 07:25), Max: 98.9 (03 Jul 2019 23:05)  HR: 63 (04 Jul 2019 07:25) (55 - 76)  BP: 125/70 (04 Jul 2019 07:25) (94/64 - 183/89)  BP(mean): --  RR: 18 (04 Jul 2019 07:25) (15 - 18)  SpO2: 94% (04 Jul 2019 07:25) (92% - 100%)    I&O's Summary    Weight (kg): 90.7 (07-03 @ 15:54)        LABS: All Labs Reviewed:                        15.1   9.56  )-----------( 195      ( 04 Jul 2019 06:24 )             46.2    04 Jul 2019 06:24    143    |  108    |  20     ----------------------------<  152    3.7     |  28     |  1.10     Ca    7.7        04 Jul 2019 06:24  Phos  3.2       04 Jul 2019 06:24  Mg     1.9       04 Jul 2019 06:24    TPro  6.9    /  Alb  3.3    /  TBili  0.6    /  DBili  x      /  AST  12     /  ALT  18     /  AlkPhos  103    03 Jul 2019 17:10    PT/INR - ( 03 Jul 2019 17:10 )   PT: 13.4 sec;   INR: 1.18 ratio         PTT - ( 03 Jul 2019 17:10 )  PTT:30.7 sec  CARDIAC MARKERS ( 03 Jul 2019 17:10 )  <.015 ng/mL / x     / x     / x     / x               Interpretation of Telemetry: Overnight on telemetry afib/flutter 60's 3 beat WCT      Physical Exam:  Appearance: [ ] Normal  [ ] abnormal [X ] NAD   Eyes: [ ] PERRL [ ] EOMI  HEENT: [ ] Normal [ ] Abnormal oral mucosa [ ]NC/AT  Cardiovascular: [X ] S1 [X ] S2 [ ] RRR [ ] m/r/g [ ]edema [ ] JVP  Procedural Access Site: [ ]  hematoma [ ] tender to palpation [ ] 2+ pulse [ ] bruit [ ] Ecchymosis  Respiratory: [X ] Clear to auscultation bilaterally  Gastrointestinal: [ ] Soft [ ] tenderness[ ] distension [ ] BS  Musculoskeletal: [ ] clubbing [ ] joint deformity   Neurologic: [ ] Non-focal  Lymphatic: [ ] lymphadenopathy  Psychiatry: [X ] AAOx3  [ ] confused [ ] disoriented [ ] Mood & affect appropriate  Skin: [ ]  rashes [ ] ecchymoses [ ] cyanosis NP Progress Note     [facility] -- Maxwell Gonzalez, Gisele, Santa, Tamir Hawley Savella  Office # [phone]      Follow Up: Adolfo - Atrial arrythmia     HPI:  78 year old male who is a poor historian with a past medical history of CAD previous atherectomy about 6 years ago, diabetes, HTN, presented to the ED from [facility] with an episode of weakness, fatigue and near syncope?, after going to the bathroom. No family at bedside at time of history. History obtained from chart and patient. Patient states that he has recently been on a high fruit diet and was in his normal state of health this morning. This afternoon he went to the mens room and had a "massive" amount of diarrhea. Immediately following the diarrheal episode he felt very weak and tired. He managed to walk out of the bathroom and took rest on a nearby couch. He continued to defecate when on the couch because he was too weak to get back to the bathroom. [facility] staff called an ambulance and he was brought to the [place] ED. The patient denies losing consciousness, denies chest pain, SOB, headaches, fevers, chills, diarrhea before today, palpitations headaches, focal weakness, or any other symptoms. At the time of admission, the pt was quite dismissive of his symptoms and he feels that admission is unnecessary.     ED Vitals: T: 97.6, HR: 55, BP: 94/64 then 171/79, RR: 16, 100 RA   Labs significant for:   WBC: 14.83, INR: 1.18, Cr: 1.4, glucose: 258, first trop negative     In the Ed patient was given: IVF bolus X1   Upon presentation to ED, patient in coarse atrial fibrillation vs atrial flutter with generally slow rates. The QRS morphology also has an intermittent LBBB. (03 Jul 2019 19:55)        Subjective/Observations: Pt. seen and examined and evaluated. Pt. resting comfortably in bed in NAD, with no respiratory distress, no chest pain, dyspnea, palpitations, PND, or orthopnea. He wants to go home. He does not want any new medications without speaking to his cardiologist.       REVIEW OF SYSTEMS: All other review of systems is negative unless indicated above    PAST MEDICAL & SURGICAL HISTORY:  Hyperlipidemia  Hypertension  Diabetes mellitus  No significant past surgical history      MEDICATIONS  (STANDING):  amLODIPine   Tablet 10 milliGRAM(s) Oral daily  atorvastatin 20 milliGRAM(s) Oral at bedtime  clopidogrel Tablet 75 milliGRAM(s) Oral daily  dextrose 5%. 1000 milliLiter(s) (50 mL/Hr) IV Continuous <Continuous>  dextrose 50% Injectable 12.5 Gram(s) IV Push once  dextrose 50% Injectable 25 Gram(s) IV Push once  dextrose 50% Injectable 25 Gram(s) IV Push once  enoxaparin Injectable 90 milliGRAM(s) SubCutaneous two times a day  insulin glargine Injectable (LANTUS) 40 Unit(s) SubCutaneous at bedtime  insulin lispro (HumaLOG) corrective regimen sliding scale   SubCutaneous three times a day before meals  insulin lispro (HumaLOG) corrective regimen sliding scale   SubCutaneous at bedtime    MEDICATIONS  (PRN):  dextrose 40% Gel 15 Gram(s) Oral once PRN Blood Glucose LESS THAN 70 milliGRAM(s)/deciliter  glucagon  Injectable 1 milliGRAM(s) IntraMuscular once PRN Glucose LESS THAN 70 milligrams/deciliter  melatonin 3 milliGRAM(s) Oral at bedtime PRN Insomnia      Allergies:  No Known Allergies      Vital Signs Last 24 Hrs  T(C): 36.9 (04 Jul 2019 07:25), Max: 37.2 (03 Jul 2019 23:05)  T(F): 98.5 (04 Jul 2019 07:25), Max: 98.9 (03 Jul 2019 23:05)  HR: 63 (04 Jul 2019 07:25) (55 - 76)  BP: 125/70 (04 Jul 2019 07:25) (94/64 - 183/89)  BP(mean): --  RR: 18 (04 Jul 2019 07:25) (15 - 18)  SpO2: 94% (04 Jul 2019 07:25) (92% - 100%)    I&O's Summary    Weight (kg): 90.7 (07-03 @ 15:54)        LABS: All Labs Reviewed:                        15.1   9.56  )-----------( 195      ( 04 Jul 2019 06:24 )             46.2    04 Jul 2019 06:24    143    |  108    |  20     ----------------------------<  152    3.7     |  28     |  1.10     Ca    7.7        04 Jul 2019 06:24  Phos  3.2       04 Jul 2019 06:24  Mg     1.9       04 Jul 2019 06:24    TPro  6.9    /  Alb  3.3    /  TBili  0.6    /  DBili  x      /  AST  12     /  ALT  18     /  AlkPhos  103    03 Jul 2019 17:10    PT/INR - ( 03 Jul 2019 17:10 )   PT: 13.4 sec;   INR: 1.18 ratio         PTT - ( 03 Jul 2019 17:10 )  PTT:30.7 sec  CARDIAC MARKERS ( 03 Jul 2019 17:10 )  <.015 ng/mL / x     / x     / x     / x               Interpretation of Telemetry: Overnight on telemetry afib/flutter 60's 3 beat WCT, now converted to sr      Physical Exam:  Gen: NAD, awake and alert  HEENT: MMM, anicteric  Heart: RRR, S1, S2, distant sounds, 1/6 SM  Lung: Decreased breath sounds b/l. No rales, crackles or wheeze appreciated.   Gi: soft NT, ND +BS  lymph: no edema, no LA  skin: no visible rashes ulcers  psych: appropriate mood and affect

## 2019-07-04 NOTE — PROGRESS NOTE ADULT - ASSESSMENT
Mr. Tate is a 78 year old male with CAD, HTN and DM2, here with an episode of weakness and near syncope, after going to the bathroom. He blames all of this on eating a lot of fruit, dehydration and diarrhea.  He reports a history of CAD, and had an atherectomy about 6 years ago, though no stents. He is currently on Plavix now, though cannot recall any of his other medications.  He has no history of atrial fibrillation or flutter, and has not been on anticoagulation.  Upon presentation he was is in a coarse atrial fibrillation vs atrial flutter with generally slow rates. The QRS morphology also has an intermittent LBBB.    - t is unclear to me if his nichelle-atrial arrythmia is related to his symptoms or coincidental. His labs do suggest dehydration so the event could be vagal in the setting of this.  - Continue monitoring on telemetry   - Echocardiogram pending   - A medication list from 2013 shows he has been on Toprol XL, continue to hold AVN blockers   - In the setting of this new AF/flutter, we had a long discussion regarding a/c. His CHADS2-vasc is elevated, and he should be on Eliquis. He does not want to start this without speaking to his cardiologist. He also has been wobbly          on his feet, so the risk of fall needs to be evaluated.  - C/W  full dose Lovenox for now until a decision is made  - No sign of acute ischemia. His troponin is negative  - No sign of congestive heart failure or volume overload  - Watch creatinine and electrolytes. Keep K>4, mg>2  - Will follow with you while admitted.    Francine Hernandez DNP,ANP-c  Cardiologist Mr. Tate is a 78 year old male with CAD, HTN and DM2, here with an episode of weakness and near syncope, after going to the bathroom. He blames all of this on eating a lot of fruit, dehydration and diarrhea.  He reports a history of CAD, and had an atherectomy about 6 years ago, though no stents. He is currently on Plavix now, though cannot recall any of his other medications.  He has no history of atrial fibrillation or flutter, and has not been on anticoagulation.  Upon presentation he was is in a coarse atrial fibrillation vs atrial flutter with generally slow rates. The QRS morphology also has an intermittent LBBB.    - It is unclear to me if his nichelle-atrial arrythmia is related to his symptoms or coincidental. His labs do suggest dehydration so the event could be vagal in the setting of this.  - Continue monitoring on telemetry   - Echocardiogram pending   - A medication list from 2013 shows he has been on Toprol XL, continue to hold AVN blockers   - In the setting of this new AF/flutter, we had a long discussion regarding a/c. His CHADS2-vasc is elevated, and he should be on Eliquis. He does not want to start this without speaking to his cardiologist.    - C/W  full dose Lovenox for now until a decision is made  - No sign of acute ischemia. His troponin is negative  - No sign of congestive heart failure or volume overload  - Watch creatinine and electrolytes. Keep K>4, mg>2  - Will follow with you while admitted.    Francine Hernandez DNP,ANP-c  Cardiologist

## 2019-07-04 NOTE — DISCHARGE NOTE NURSING/CASE MANAGEMENT/SOCIAL WORK - NSDCDPATPORTLINK_GEN_ALL_CORE
Doxepin Pregnancy And Lactation Text: This medication is Pregnancy Category C and it isn't known if it is safe during pregnancy. It is also excreted in breast milk and breast feeding isn't recommended. Simponi Counseling:  I discussed with the patient the risks of golimumab including but not limited to myelosuppression, immunosuppression, autoimmune hepatitis, demyelinating diseases, lymphoma, and serious infections.  The patient understands that monitoring is required including a PPD at baseline and must alert us or the primary physician if symptoms of infection or other concerning signs are noted. Gabapentin Counseling: I discussed with the patient the risks of gabapentin including but not limited to dizziness, somnolence, fatigue and ataxia. High Dose Vitamin A Pregnancy And Lactation Text: High dose vitamin A therapy is contraindicated during pregnancy and breast feeding. Tazorac Pregnancy And Lactation Text: This medication is not safe during pregnancy. It is unknown if this medication is excreted in breast milk. Sski Pregnancy And Lactation Text: This medication is Pregnancy Category D and isn't considered safe during pregnancy. It is excreted in breast milk. Xolair Counseling:  Patient informed of potential adverse effects including but not limited to fever, muscle aches, rash and allergic reactions.  The patient verbalized understanding of the proper use and possible adverse effects of Xolair.  All of the patient's questions and concerns were addressed. Doxycycline Pregnancy And Lactation Text: This medication is Pregnancy Category D and not consider safe during pregnancy. It is also excreted in breast milk but is considered safe for shorter treatment courses. Arava Counseling:  Patient counseled regarding adverse effects of Arava including but not limited to nausea, vomiting, abnormalities in liver function tests. Patients may develop mouth sores, rash, diarrhea, and abnormalities in blood counts. The patient understands that monitoring is required including LFTs and blood counts.  There is a rare possibility of scarring of the liver and lung problems that can occur when taking methotrexate. Persistent nausea, loss of appetite, pale stools, dark urine, cough, and shortness of breath should be reported immediately. Patient advised to discontinue Arava treatment and consult with a physician prior to attempting conception. The patient will have to undergo a treatment to eliminate Arava from the body prior to conception. Arava Pregnancy And Lactation Text: This medication is Pregnancy Category X and is absolutely contraindicated during pregnancy. It is unknown if it is excreted in breast milk. Thalidomide Counseling: I discussed with the patient the risks of thalidomide including but not limited to birth defects, anxiety, weakness, chest pain, dizziness, cough and severe allergy. Doxycycline Counseling:  Patient counseled regarding possible photosensitivity and increased risk for sunburn.  Patient instructed to avoid sunlight, if possible.  When exposed to sunlight, patients should wear protective clothing, sunglasses, and sunscreen.  The patient was instructed to call the office immediately if the following severe adverse effects occur:  hearing changes, easy bruising/bleeding, severe headache, or vision changes.  The patient verbalized understanding of the proper use and possible adverse effects of doxycycline.  All of the patient's questions and concerns were addressed. Prednisone Pregnancy And Lactation Text: This medication is Pregnancy Category C and it isn't know if it is safe during pregnancy. This medication is excreted in breast milk. Picato Counseling:  I discussed with the patient the risks of Picato including but not limited to erythema, scaling, itching, weeping, crusting, and pain. You can access the [facility] Patient Portal, offered by [facility], by registering with the following website: [email or web address] Itraconazole Counseling:  I discussed with the patient the risks of itraconazole including but not limited to liver damage, nausea/vomiting, neuropathy, and severe allergy.  The patient understands that this medication is best absorbed when taken with acidic beverages such as non-diet cola or ginger ale.  The patient understands that monitoring is required including baseline LFTs and repeat LFTs at intervals.  The patient understands that they are to contact us or the primary physician if concerning signs are noted. Otezla Counseling: The side effects of Otezla were discussed with the patient, including but not limited to worsening or new depression, weight loss, diarrhea, nausea, upper respiratory tract infection, and headache. Patient instructed to call the office should any adverse effect occur.  The patient verbalized understanding of the proper use and possible adverse effects of Otezla.  All the patient's questions and concerns were addressed. Humira Counseling:  I discussed with the patient the risks of adalimumab including but not limited to myelosuppression, immunosuppression, autoimmune hepatitis, demyelinating diseases, lymphoma, and serious infections.  The patient understands that monitoring is required including a PPD at baseline and must alert us or the primary physician if symptoms of infection or other concerning signs are noted. Rifampin Counseling: I discussed with the patient the risks of rifampin including but not limited to liver damage, kidney damage, red-orange body fluids, nausea/vomiting and severe allergy. Elidel Counseling: Patient may experience a mild burning sensation during topical application. Elidel is not approved in children less than 2 years of age. There have been case reports of hematologic and skin malignancies in patients using topical calcineurin inhibitors although causality is questionable. Cimzia Counseling:  I discussed with the patient the risks of Cimzia including but not limited to immunosuppression, allergic reactions and infections.  The patient understands that monitoring is required including a PPD at baseline and must alert us or the primary physician if symptoms of infection or other concerning signs are noted. Xolair Pregnancy And Lactation Text: This medication is Pregnancy Category B and is considered safe during pregnancy. This medication is excreted in breast milk. Elidel Pregnancy And Lactation Text: This medication is Pregnancy Category C. It is unknown if this medication is excreted in breast milk. Erythromycin Counseling:  I discussed with the patient the risks of erythromycin including but not limited to GI upset, allergic reaction, drug rash, diarrhea, increase in liver enzymes, and yeast infections. Detail Level: Zone Gabapentin Pregnancy And Lactation Text: This medication is Pregnancy Category C and isn't considered safe during pregnancy. It is excreted in breast milk. Ketoconazole Counseling:   Patient counseled regarding improving absorption with orange juice.  Adverse effects include but are not limited to breast enlargement, headache, diarrhea, nausea, upset stomach, liver function test abnormalities, taste disturbance, and stomach pain.  There is a rare possibility of liver failure that can occur when taking ketoconazole. The patient understands that monitoring of LFTs may be required, especially at baseline. The patient verbalized understanding of the proper use and possible adverse effects of ketoconazole.  All of the patient's questions and concerns were addressed. Ilumya Counseling: I discussed with the patient the risks of tildrakizumab including but not limited to immunosuppression, malignancy, posterior leukoencephalopathy syndrome, and serious infections.  The patient understands that monitoring is required including a PPD at baseline and must alert us or the primary physician if symptoms of infection or other concerning signs are noted. Topical Clindamycin Counseling: Patient counseled that this medication may cause skin irritation or allergic reactions.  In the event of skin irritation, the patient was advised to reduce the amount of the drug applied or use it less frequently.   The patient verbalized understanding of the proper use and possible adverse effects of clindamycin.  All of the patient's questions and concerns were addressed. Topical Clindamycin Pregnancy And Lactation Text: This medication is Pregnancy Category B and is considered safe during pregnancy. It is unknown if it is excreted in breast milk. Benzoyl Peroxide Counseling: Patient counseled that medicine may cause skin irritation and bleach clothing.  In the event of skin irritation, the patient was advised to reduce the amount of the drug applied or use it less frequently.   The patient verbalized understanding of the proper use and possible adverse effects of benzoyl peroxide.  All of the patient's questions and concerns were addressed. Itraconazole Pregnancy And Lactation Text: This medication is Pregnancy Category C and it isn't know if it is safe during pregnancy. It is also excreted in breast milk. Humira Pregnancy And Lactation Text: This medication is Pregnancy Category B and is considered safe during pregnancy. It is unknown if this medication is excreted in breast milk. Clofazimine Counseling:  I discussed with the patient the risks of clofazimine including but not limited to skin and eye pigmentation, liver damage, nausea/vomiting, gastrointestinal bleeding and allergy. Rifampin Pregnancy And Lactation Text: This medication is Pregnancy Category C and it isn't know if it is safe during pregnancy. It is also excreted in breast milk and should not be used if you are breast feeding. Simponi Pregnancy And Lactation Text: The risk during pregnancy and breastfeeding is uncertain with this medication. Hydroxyzine Counseling: Patient advised that the medication is sedating and not to drive a car after taking this medication.  Patient informed of potential adverse effects including but not limited to dry mouth, urinary retention, and blurry vision.  The patient verbalized understanding of the proper use and possible adverse effects of hydroxyzine.  All of the patient's questions and concerns were addressed. Otezla Pregnancy And Lactation Text: This medication is Pregnancy Category C and it isn't known if it is safe during pregnancy. It is unknown if it is excreted in breast milk. Azithromycin Counseling:  I discussed with the patient the risks of azithromycin including but not limited to GI upset, allergic reaction, drug rash, diarrhea, and yeast infections. Acitretin Counseling:  I discussed with the patient the risks of acitretin including but not limited to hair loss, dry lips/skin/eyes, liver damage, hyperlipidemia, depression/suicidal ideation, photosensitivity.  Serious rare side effects can include but are not limited to pancreatitis, pseudotumor cerebri, bony changes, clot formation/stroke/heart attack.  Patient understands that alcohol is contraindicated since it can result in liver toxicity and significantly prolong the elimination of the drug by many years. Erythromycin Pregnancy And Lactation Text: This medication is Pregnancy Category B and is considered safe during pregnancy. It is also excreted in breast milk. Protopic Counseling: Patient may experience a mild burning sensation during topical application. Protopic is not approved in children less than 2 years of age. There have been case reports of hematologic and skin malignancies in patients using topical calcineurin inhibitors although causality is questionable. Oxybutynin Counseling:  I discussed with the patient the risks of oxybutynin including but not limited to skin rash, drowsiness, dry mouth, difficulty urinating, and blurred vision. Ketoconazole Pregnancy And Lactation Text: This medication is Pregnancy Category C and it isn't know if it is safe during pregnancy. It is also excreted in breast milk and breast feeding isn't recommended. Eucrisa Counseling: Patient may experience a mild burning sensation during topical application. Eucrisa is not approved in children less than 2 years of age. Tetracycline Pregnancy And Lactation Text: This medication is Pregnancy Category D and not consider safe during pregnancy. It is also excreted in breast milk. Glycopyrrolate Counseling:  I discussed with the patient the risks of glycopyrrolate including but not limited to skin rash, drowsiness, dry mouth, difficulty urinating, and blurred vision. Glycopyrrolate Pregnancy And Lactation Text: This medication is Pregnancy Category B and is considered safe during pregnancy. It is unknown if it is excreted breast milk. Tetracycline Counseling: Patient counseled regarding possible photosensitivity and increased risk for sunburn.  Patient instructed to avoid sunlight, if possible.  When exposed to sunlight, patients should wear protective clothing, sunglasses, and sunscreen.  The patient was instructed to call the office immediately if the following severe adverse effects occur:  hearing changes, easy bruising/bleeding, severe headache, or vision changes.  The patient verbalized understanding of the proper use and possible adverse effects of tetracycline.  All of the patient's questions and concerns were addressed. Patient understands to avoid pregnancy while on therapy due to potential birth defects. Hydroxyzine Pregnancy And Lactation Text: This medication is not safe during pregnancy and should not be taken. It is also excreted in breast milk and breast feeding isn't recommended. Topical Sulfur Applications Counseling: Topical Sulfur Counseling: Patient counseled that this medication may cause skin irritation or allergic reactions.  In the event of skin irritation, the patient was advised to reduce the amount of the drug applied or use it less frequently.   The patient verbalized understanding of the proper use and possible adverse effects of topical sulfur application.  All of the patient's questions and concerns were addressed. Stelara Counseling:  I discussed with the patient the risks of ustekinumab including but not limited to immunosuppression, malignancy, posterior leukoencephalopathy syndrome, and serious infections.  The patient understands that monitoring is required including a PPD at baseline and must alert us or the primary physician if symptoms of infection or other concerning signs are noted. Azithromycin Pregnancy And Lactation Text: This medication is considered safe during pregnancy and is also secreted in breast milk. Benzoyl Peroxide Pregnancy And Lactation Text: This medication is Pregnancy Category C. It is unknown if benzoyl peroxide is excreted in breast milk. Valtrex Counseling: I discussed with the patient the risks of valacyclovir including but not limited to kidney damage, nausea, vomiting and severe allergy.  The patient understands that if the infection seems to be worsening or is not improving, they are to call. Cimzia Pregnancy And Lactation Text: This medication crosses the placenta but can be considered safe in certain situations. Cimzia may be excreted in breast milk. Colchicine Counseling:  Patient counseled regarding adverse effects including but not limited to stomach upset (nausea, vomiting, stomach pain, or diarrhea).  Patient instructed to limit alcohol consumption while taking this medication.  Colchicine may reduce blood counts especially with prolonged use.  The patient understands that monitoring of kidney function and blood counts may be required, especially at baseline. The patient verbalized understanding of the proper use and possible adverse effects of colchicine.  All of the patient's questions and concerns were addressed. Terbinafine Counseling: Patient counseling regarding adverse effects of terbinafine including but not limited to headache, diarrhea, rash, upset stomach, liver function test abnormalities, itching, taste/smell disturbance, nausea, abdominal pain, and flatulence.  There is a rare possibility of liver failure that can occur when taking terbinafine.  The patient understands that a baseline LFT and kidney function test may be required. The patient verbalized understanding of the proper use and possible adverse effects of terbinafine.  All of the patient's questions and concerns were addressed. Infliximab Counseling:  I discussed with the patient the risks of infliximab including but not limited to myelosuppression, immunosuppression, autoimmune hepatitis, demyelinating diseases, lymphoma, and serious infections.  The patient understands that monitoring is required including a PPD at baseline and must alert us or the primary physician if symptoms of infection or other concerning signs are noted. Valtrex Pregnancy And Lactation Text: this medication is Pregnancy Category B and is considered safe during pregnancy. This medication is not directly found in breast milk but it's metabolite acyclovir is present. Acitretin Pregnancy And Lactation Text: This medication is Pregnancy Category X and should not be given to women who are pregnant or may become pregnant in the future. This medication is excreted in breast milk. Protopic Pregnancy And Lactation Text: This medication is Pregnancy Category C. It is unknown if this medication is excreted in breast milk when applied topically. Azathioprine Counseling:  I discussed with the patient the risks of azathioprine including but not limited to myelosuppression, immunosuppression, hepatotoxicity, lymphoma, and infections.  The patient understands that monitoring is required including baseline LFTs, Creatinine, possible TPMP genotyping and weekly CBCs for the first month and then every 2 weeks thereafter.  The patient verbalized understanding of the proper use and possible adverse effects of azathioprine.  All of the patient's questions and concerns were addressed. Albendazole Counseling:  I discussed with the patient the risks of albendazole including but not limited to cytopenia, kidney damage, nausea/vomiting and severe allergy.  The patient understands that this medication is being used in an off-label manner. Taltz Counseling: I discussed with the patient the risks of ixekizumab including but not limited to immunosuppression, serious infections, worsening of inflammatory bowel disease and drug reactions.  The patient understands that monitoring is required including a PPD at baseline and must alert us or the primary physician if symptoms of infection or other concerning signs are noted. Eucrisa Pregnancy And Lactation Text: This medication has not been assigned a Pregnancy Risk Category but animal studies failed to show danger with the topical medication. It is unknown if the medication is excreted in breast milk. Bactrim Pregnancy And Lactation Text: This medication is Pregnancy Category D and is known to cause fetal risk.  It is also excreted in breast milk. Birth Control Pills Counseling: Birth Control Pill Counseling: I discussed with the patient the potential side effects of OCPs including but not limited to increased risk of stroke, heart attack, thrombophlebitis, deep venous thrombosis, hepatic adenomas, breast changes, GI upset, headaches, and depression.  The patient verbalized understanding of the proper use and possible adverse effects of OCPs. All of the patient's questions and concerns were addressed. Bactrim Counseling:  I discussed with the patient the risks of sulfa antibiotics including but not limited to GI upset, allergic reaction, drug rash, diarrhea, dizziness, photosensitivity, and yeast infections.  Rarely, more serious reactions can occur including but not limited to aplastic anemia, agranulocytosis, methemoglobinemia, blood dyscrasias, liver or kidney failure, lung infiltrates or desquamative/blistering drug rashes. Hydroquinone Counseling:  Patient advised that medication may result in skin irritation, lightening (hypopigmentation), dryness, and burning.  In the event of skin irritation, the patient was advised to reduce the amount of the drug applied or use it less frequently.  Rarely, spots that are treated with hydroquinone can become darker (pseudoochronosis).  Should this occur, patient instructed to stop medication and call the office. The patient verbalized understanding of the proper use and possible adverse effects of hydroquinone.  All of the patient's questions and concerns were addressed. Cosentyx Counseling:  I discussed with the patient the risks of Cosentyx including but not limited to worsening of Crohn's disease, immunosuppression, allergic reactions and infections.  The patient understands that monitoring is required including a PPD at baseline and must alert us or the primary physician if symptoms of infection or other concerning signs are noted. Hydroxychloroquine Counseling:  I discussed with the patient that a baseline ophthalmologic exam is needed at the start of therapy and every year thereafter while on therapy. A CBC may also be warranted for monitoring.  The side effects of this medication were discussed with the patient, including but not limited to agranulocytosis, aplastic anemia, seizures, rashes, retinopathy, and liver toxicity. Patient instructed to call the office should any adverse effect occur.  The patient verbalized understanding of the proper use and possible adverse effects of Plaquenil.  All the patient's questions and concerns were addressed. Metronidazole Counseling:  I discussed with the patient the risks of metronidazole including but not limited to seizures, nausea/vomiting, a metallic taste in the mouth, nausea/vomiting and severe allergy. Carac Counseling:  I discussed with the patient the risks of Carac including but not limited to erythema, scaling, itching, weeping, crusting, and pain. Topical Sulfur Applications Pregnancy And Lactation Text: This medication is Pregnancy Category C and has an unknown safety profile during pregnancy. It is unknown if this topical medication is excreted in breast milk. Carac Pregnancy And Lactation Text: This medication is Pregnancy Category X and contraindicated in pregnancy and in women who may become pregnant. It is unknown if this medication is excreted in breast milk. Azathioprine Pregnancy And Lactation Text: This medication is Pregnancy Category D and isn't considered safe during pregnancy. It is unknown if this medication is excreted in breast milk. Fluconazole Counseling:  Patient counseled regarding adverse effects of fluconazole including but not limited to headache, diarrhea, nausea, upset stomach, liver function test abnormalities, taste disturbance, and stomach pain.  There is a rare possibility of liver failure that can occur when taking fluconazole.  The patient understands that monitoring of LFTs and kidney function test may be required, especially at baseline. The patient verbalized understanding of the proper use and possible adverse effects of fluconazole.  All of the patient's questions and concerns were addressed. Albendazole Pregnancy And Lactation Text: This medication is Pregnancy Category C and it isn't known if it is safe during pregnancy. It is also excreted in breast milk. Solaraze Counseling:  I discussed with the patient the risks of Solaraze including but not limited to erythema, scaling, itching, weeping, crusting, and pain. Cephalexin Counseling: I counseled the patient regarding use of cephalexin as an antibiotic for prophylactic and/or therapeutic purposes. Cephalexin (commonly prescribed under brand name Keflex) is a cephalosporin antibiotic which is active against numerous classes of bacteria, including most skin bacteria. Side effects may include nausea, diarrhea, gastrointestinal upset, rash, hives, yeast infections, and in rare cases, hepatitis, kidney disease, seizures, fever, confusion, neurologic symptoms, and others. Patients with severe allergies to penicillin medications are cautioned that there is about a 10% incidence of cross-reactivity with cephalosporins. When possible, patients with penicillin allergies should use alternatives to cephalosporins for antibiotic therapy. Dupixent Counseling: I discussed with the patient the risks of dupilumab including but not limited to eye infection and irritation, cold sores, injection site reactions, worsening of asthma, allergic reactions and increased risk of parasitic infection.  Live vaccines should be avoided while taking dupilumab. Dupilumab will also interact with certain medications such as warfarin and cyclosporine. The patient understands that monitoring is required and they must alert us or the primary physician if symptoms of infection or other concerning signs are noted. Bexarotene Counseling:  I discussed with the patient the risks of bexarotene including but not limited to hair loss, dry lips/skin/eyes, liver abnormalities, hyperlipidemia, pancreatitis, depression/suicidal ideation, photosensitivity, drug rash/allergic reactions, hypothyroidism, anemia, leukopenia, infection, cataracts, and teratogenicity.  Patient understands that they will need regular blood tests to check lipid profile, liver function tests, white blood cell count, thyroid function tests and pregnancy test if applicable. Bexarotene Pregnancy And Lactation Text: This medication is Pregnancy Category X and should not be given to women who are pregnant or may become pregnant. This medication should not be used if you are breast feeding. Solaraze Pregnancy And Lactation Text: This medication is Pregnancy Category B and is considered safe. There is some data to suggest avoiding during the third trimester. It is unknown if this medication is excreted in breast milk. Zyclara Counseling:  I discussed with the patient the risks of imiquimod including but not limited to erythema, scaling, itching, weeping, crusting, and pain.  Patient understands that the inflammatory response to imiquimod is variable from person to person and was educated regarded proper titration schedule.  If flu-like symptoms develop, patient knows to discontinue the medication and contact us. Birth Control Pills Pregnancy And Lactation Text: This medication should be avoided if pregnant and for the first 30 days post-partum. Metronidazole Pregnancy And Lactation Text: This medication is Pregnancy Category B and considered safe during pregnancy.  It is also excreted in breast milk. Terbinafine Pregnancy And Lactation Text: This medication is Pregnancy Category B and is considered safe during pregnancy. It is also excreted in breast milk and breast feeding isn't recommended. Hydroxychloroquine Pregnancy And Lactation Text: This medication has been shown to cause fetal harm but it isn't assigned a Pregnancy Risk Category. There are small amounts excreted in breast milk. Cyclosporine Counseling:  I discussed with the patient the risks of cyclosporine including but not limited to hypertension, gingival hyperplasia,myelosuppression, immunosuppression, liver damage, kidney damage, neurotoxicity, lymphoma, and serious infections. The patient understands that monitoring is required including baseline blood pressure, CBC, CMP, lipid panel and uric acid, and then 1-2 times monthly CMP and blood pressure. Include Pregnancy/Lactation Warning?: No Tremfya Counseling: I discussed with the patient the risks of guselkumab including but not limited to immunosuppression, serious infections, worsening of inflammatory bowel disease and drug reactions.  The patient understands that monitoring is required including a PPD at baseline and must alert us or the primary physician if symptoms of infection or other concerning signs are noted. Cephalexin Pregnancy And Lactation Text: This medication is Pregnancy Category B and considered safe during pregnancy.  It is also excreted in breast milk but can be used safely for shorter doses. Nsaids Counseling: NSAID Counseling: I discussed with the patient that NSAIDs should be taken with food. Prolonged use of NSAIDs can result in the development of stomach ulcers.  Patient advised to stop taking NSAIDs if abdominal pain occurs.  The patient verbalized understanding of the proper use and possible adverse effects of NSAIDs.  All of the patient's questions and concerns were addressed. Dupixent Pregnancy And Lactation Text: This medication likely crosses the placenta but the risk for the fetus is uncertain. This medication is excreted in breast milk. 5-Fu Counseling: 5-Fluorouracil Counseling:  I discussed with the patient the risks of 5-fluorouracil including but not limited to erythema, scaling, itching, weeping, crusting, and pain. Dapsone Counseling: I discussed with the patient the risks of dapsone including but not limited to hemolytic anemia, agranulocytosis, rashes, methemoglobinemia, kidney failure, peripheral neuropathy, headaches, GI upset, and liver toxicity.  Patients who start dapsone require monitoring including baseline LFTs and weekly CBCs for the first month, then every month thereafter.  The patient verbalized understanding of the proper use and possible adverse effects of dapsone.  All of the patient's questions and concerns were addressed. Dapsone Pregnancy And Lactation Text: This medication is Pregnancy Category C and is not considered safe during pregnancy or breast feeding. Minocycline Counseling: Patient advised regarding possible photosensitivity and discoloration of the teeth, skin, lips, tongue and gums.  Patient instructed to avoid sunlight, if possible.  When exposed to sunlight, patients should wear protective clothing, sunglasses, and sunscreen.  The patient was instructed to call the office immediately if the following severe adverse effects occur:  hearing changes, easy bruising/bleeding, severe headache, or vision changes.  The patient verbalized understanding of the proper use and possible adverse effects of minocycline.  All of the patient's questions and concerns were addressed. Isotretinoin Counseling: Patient should get monthly blood tests, not donate blood, not drive at night if vision affected, not share medication, and not undergo elective surgery for 6 months after tx completed. Side effects reviewed, pt to contact office should one occur. Topical Retinoid counseling:  Patient advised to apply a pea-sized amount only at bedtime and wait 30 minutes after washing their face before applying.  If too drying, patient may add a non-comedogenic moisturizer. The patient verbalized understanding of the proper use and possible adverse effects of retinoids.  All of the patient's questions and concerns were addressed. Rituxan Counseling:  I discussed with the patient the risks of Rituxan infusions. Side effects can include infusion reactions, severe drug rashes including mucocutaneous reactions, reactivation of latent hepatitis and other infections and rarely progressive multifocal leukoencephalopathy.  All of the patient's questions and concerns were addressed. Spironolactone Counseling: Patient advised regarding risks of diarrhea, abdominal pain, hyperkalemia, birth defects (for female patients), liver toxicity and renal toxicity. The patient may need blood work to monitor liver and kidney function and potassium levels while on therapy. The patient verbalized understanding of the proper use and possible adverse effects of spironolactone.  All of the patient's questions and concerns were addressed. Cellcept Counseling:  I discussed with the patient the risks of mycophenolate mofetil including but not limited to infection/immunosuppression, GI upset, hypokalemia, hypercholesterolemia, bone marrow suppression, lymphoproliferative disorders, malignancy, GI ulceration/bleed/perforation, colitis, interstitial lung disease, kidney failure, progressive multifocal leukoencephalopathy, and birth defects.  The patient understands that monitoring is required including a baseline creatinine and regular CBC testing. In addition, patient must alert us immediately if symptoms of infection or other concerning signs are noted. Imiquimod Counseling:  I discussed with the patient the risks of imiquimod including but not limited to erythema, scaling, itching, weeping, crusting, and pain.  Patient understands that the inflammatory response to imiquimod is variable from person to person and was educated regarded proper titration schedule.  If flu-like symptoms develop, patient knows to discontinue the medication and contact us. Ivermectin Counseling:  Patient instructed to take medication on an empty stomach with a full glass of water.  Patient informed of potential adverse effects including but not limited to nausea, diarrhea, dizziness, itching, and swelling of the extremities or lymph nodes.  The patient verbalized understanding of the proper use and possible adverse effects of ivermectin.  All of the patient's questions and concerns were addressed. Methotrexate Counseling:  Patient counseled regarding adverse effects of methotrexate including but not limited to nausea, vomiting, abnormalities in liver function tests. Patients may develop mouth sores, rash, diarrhea, and abnormalities in blood counts. The patient understands that monitoring is required including LFT's and blood counts.  There is a rare possibility of scarring of the liver and lung problems that can occur when taking methotrexate. Persistent nausea, loss of appetite, pale stools, dark urine, cough, and shortness of breath should be reported immediately. Patient advised to discontinue methotrexate treatment at least three months before attempting to become pregnant.  I discussed the need for folate supplements while taking methotrexate.  These supplements can decrease side effects during methotrexate treatment. The patient verbalized understanding of the proper use and possible adverse effects of methotrexate.  All of the patient's questions and concerns were addressed. Griseofulvin Counseling:  I discussed with the patient the risks of griseofulvin including but not limited to photosensitivity, cytopenia, liver damage, nausea/vomiting and severe allergy.  The patient understands that this medication is best absorbed when taken with a fatty meal (e.g., ice cream or french fries). Enbrel Counseling:  I discussed with the patient the risks of etanercept including but not limited to myelosuppression, immunosuppression, autoimmune hepatitis, demyelinating diseases, lymphoma, and infections.  The patient understands that monitoring is required including a PPD at baseline and must alert us or the primary physician if symptoms of infection or other concerning signs are noted. Spironolactone Pregnancy And Lactation Text: This medication can cause feminization of the male fetus and should be avoided during pregnancy. The active metabolite is also found in breast milk. Quinolones Counseling:  I discussed with the patient the risks of fluoroquinolones including but not limited to GI upset, allergic reaction, drug rash, diarrhea, dizziness, photosensitivity, yeast infections, liver function test abnormalities, tendonitis/tendon rupture. Nsaids Pregnancy And Lactation Text: These medications are considered safe up to 30 weeks gestation. It is excreted in breast milk. Siliq Counseling:  I discussed with the patient the risks of Siliq including but not limited to new or worsening depression, suicidal thoughts and behavior, immunosuppression, malignancy, posterior leukoencephalopathy syndrome, and serious infections.  The patient understands that monitoring is required including a PPD at baseline and must alert us or the primary physician if symptoms of infection or other concerning signs are noted. There is also a special program designed to monitor depression which is required with Siliq. Rituxan Pregnancy And Lactation Text: This medication is Pregnancy Category C and it isn't know if it is safe during pregnancy. It is unknown if this medication is excreted in breast milk but similar antibodies are known to be excreted. Drysol Counseling:  I discussed with the patient the risks of drysol/aluminum chloride including but not limited to skin rash, itching, irritation, burning. Cimetidine Counseling:  I discussed with the patient the risks of Cimetidine including but not limited to gynecomastia, headache, diarrhea, nausea, drowsiness, arrhythmias, pancreatitis, skin rashes, psychosis, bone marrow suppression and kidney toxicity. Erivedge Counseling- I discussed with the patient the risks of Erivedge including but not limited to nausea, vomiting, diarrhea, constipation, weight loss, changes in the sense of taste, decreased appetite, muscle spasms, and hair loss.  The patient verbalized understanding of the proper use and possible adverse effects of Erivedge.  All of the patient's questions and concerns were addressed. Clindamycin Counseling: I counseled the patient regarding use of clindamycin as an antibiotic for prophylactic and/or therapeutic purposes. Clindamycin is active against numerous classes of bacteria, including skin bacteria. Side effects may include nausea, diarrhea, gastrointestinal upset, rash, hives, yeast infections, and in rare cases, colitis. Isotretinoin Pregnancy And Lactation Text: This medication is Pregnancy Category X and is considered extremely dangerous during pregnancy. It is unknown if it is excreted in breast milk. High Dose Vitamin A Counseling: Side effects reviewed, pt to contact office should one occur. Tazorac Counseling:  Patient advised that medication is irritating and drying.  Patient may need to apply sparingly and wash off after an hour before eventually leaving it on overnight.  The patient verbalized understanding of the proper use and possible adverse effects of tazorac.  All of the patient's questions and concerns were addressed. SSKI Counseling:  I discussed with the patient the risks of SSKI including but not limited to thyroid abnormalities, metallic taste, GI upset, fever, headache, acne, arthralgias, paraesthesias, lymphadenopathy, easy bleeding, arrhythmias, and allergic reaction. Doxepin Counseling:  Patient advised that the medication is sedating and not to drive a car after taking this medication. Patient informed of potential adverse effects including but not limited to dry mouth, urinary retention, and blurry vision.  The patient verbalized understanding of the proper use and possible adverse effects of doxepin.  All of the patient's questions and concerns were addressed. Cyclophosphamide Pregnancy And Lactation Text: This medication is Pregnancy Category D and it isn't considered safe during pregnancy. This medication is excreted in breast milk. Methotrexate Pregnancy And Lactation Text: This medication is Pregnancy Category X and is known to cause fetal harm. This medication is excreted in breast milk. Minoxidil Counseling: Minoxidil is a topical medication which can increase blood flow where it is applied. It is uncertain how this medication increases hair growth. Side effects are uncommon and include stinging and allergic reactions. Odomzo Counseling- I discussed with the patient the risks of Odomzo including but not limited to nausea, vomiting, diarrhea, constipation, weight loss, changes in the sense of taste, decreased appetite, muscle spasms, and hair loss.  The patient verbalized understanding of the proper use and possible adverse effects of Odomzo.  All of the patient's questions and concerns were addressed. Xelingrisz Pregnancy And Lactation Text: This medication is Pregnancy Category D and is not considered safe during pregnancy.  The risk during breast feeding is also uncertain. Cyclophosphamide Counseling:  I discussed with the patient the risks of cyclophosphamide including but not limited to hair loss, hormonal abnormalities, decreased fertility, abdominal pain, diarrhea, nausea and vomiting, bone marrow suppression and infection. The patient understands that monitoring is required while taking this medication. Prednisone Counseling:  I discussed with the patient the risks of prolonged use of prednisone including but not limited to weight gain, insomnia, osteoporosis, mood changes, diabetes, susceptibility to infection, glaucoma and high blood pressure.  In cases where prednisone use is prolonged, patients should be monitored with blood pressure checks, serum glucose levels and an eye exam.  Additionally, the patient may need to be placed on GI prophylaxis, PCP prophylaxis, and calcium and vitamin D supplementation and/or a bisphosphonate.  The patient verbalized understanding of the proper use and the possible adverse effects of prednisone.  All of the patient's questions and concerns were addressed. Xeljanz Counseling: I discussed with the patient the risks of Xeljanz therapy including increased risk of infection, liver issues, headache, diarrhea, or cold symptoms. Live vaccines should be avoided. They were instructed to call if they have any problems. Clindamycin Pregnancy And Lactation Text: This medication can be used in pregnancy if certain situations. Clindamycin is also present in breast milk. Drysol Pregnancy And Lactation Text: This medication is considered safe during pregnancy and breast feeding. Griseofulvin Pregnancy And Lactation Text: This medication is Pregnancy Category X and is known to cause serious birth defects. It is unknown if this medication is excreted in breast milk but breast feeding should be avoided.

## 2019-07-04 NOTE — DISCHARGE NOTE PROVIDER - CARE PROVIDER_API CALL
Xavi Gonzalez)  Cardiovascular Disease; Internal Medicine  [address] [place] [address]  Phone: [phone]  Fax: [phone]  Follow Up Time:

## 2019-07-04 NOTE — PROGRESS NOTE ADULT - ATTENDING COMMENTS
I saw and examined the patient personally. Spoke with above provider regarding this case. I reviewed the above findings completely.  I agree with the above history, physical, and plan which I have edited where appropriate.  Spoke with patient at length. He does not want to stay at this hospital. he feels that nothing is wrong. I fully explained to him that he went into AF and his rates were slow. His symptoms could have been arrhythmic in nature especially with his PVCs and ?intermittent LBBB. He states that he feels fine and does not want to change medicaitons. He does not want the echo. I explained that his heart did look enlarged on the CXR. For now I would hold all AVN blockers and assess. If dc'd advised to see his cardiologist within the week.

## 2019-07-04 NOTE — DISCHARGE NOTE PROVIDER - HOSPITAL COURSE
78 year old male who is a poor historian with a past medical history of CAD previous atherectomy about 6 years ago, diabetes, HTN, presented to the ED from [facility] with an episode of weakness, fatigue and near syncope?, after going to the bathroom, found to be in coarse atrial fibrillation vs atrial flutter with generally slow rates, QRS morphology also has an intermittent LBBB on presentation admitted for ?new onset nichelle-atrial arrythmia.             Arrhythmia.  Plan: -Patient presented with weakness, ?near syncope, after using the bathroom     -coarse atrial fibrillation vs atrial flutter with generally slow rates, QRS morphology also has an intermittent LBBB on presentation    aflutter resolved    would need further outpt follow up, spoke to jaimie has outside cardiologist at [facility] would make appt    pt had bouts of bradycardia in 45s, with few pvc, pt at home is on lopressor, explaiend in great detail pt beter to be monitored on tele with further optimization of meds, pt declined and wants to go home     stated will see pmd in am, meanwhile advised to hold off lopressor and hctz untl seen by pmd/cardiologist              JUSTIN (acute kidney injury). secodnary to dehydration, prerenal, with iv fluids resolved                              Diabetes mellitus.  Plan: -Home Lantus 80 at bedtime and Humalog 5 TID premeal             Hypertension.      continue to hold hctz    can resume losartan at home              Anxiety. Plan: -Patient on Diazpem 5 mg daily PRN for anxiety at [facility], will hold while inpatient    -Other medications now filled at [facility] in [place].             CAD (coronary artery disease).  Plan: -atherectomy about 6 years ago     -continue home medication Plavix.     Hyperlipidemia.  Plan: -Continue home medication Lipitor 20 daily.         PE    nad    chest s1, s2    lungs decrease air entry at the bases    abd:BS+        d/c plannign

## 2019-07-04 NOTE — DISCHARGE NOTE PROVIDER - NSDCCPCAREPLAN_GEN_ALL_CORE_FT
PRINCIPAL DISCHARGE DIAGNOSIS  Diagnosis: Atypical atrial flutter  Assessment and Plan of Treatment: rate controlled  hold lopressor/hctz      SECONDARY DISCHARGE DIAGNOSES  Diagnosis: Syncope, unspecified syncope type  Assessment and Plan of Treatment: likely orthostatic from dehydration, resolved

## 2019-07-05 RX ORDER — METOPROLOL TARTRATE 50 MG
1 TABLET ORAL
Qty: 0 | Refills: 0 | DISCHARGE

## 2019-08-26 PROBLEM — E11.9 TYPE 2 DIABETES MELLITUS WITHOUT COMPLICATIONS: Chronic | Status: ACTIVE | Noted: 2019-07-03

## 2019-08-26 PROBLEM — I10 ESSENTIAL (PRIMARY) HYPERTENSION: Chronic | Status: ACTIVE | Noted: 2019-07-03

## 2019-08-26 PROBLEM — E78.5 HYPERLIPIDEMIA, UNSPECIFIED: Chronic | Status: ACTIVE | Noted: 2019-07-03

## 2019-10-05 PROBLEM — L98.9 SCALP LESION: Status: ACTIVE | Noted: 2019-10-04

## 2019-10-07 ENCOUNTER — APPOINTMENT (OUTPATIENT)
Dept: SURGICAL ONCOLOGY | Facility: CLINIC | Age: 79
End: 2019-10-07
Payer: MEDICARE

## 2019-10-07 VITALS
TEMPERATURE: 98.2 F | BODY MASS INDEX: 29.62 KG/M2 | SYSTOLIC BLOOD PRESSURE: 160 MMHG | HEIGHT: 69 IN | HEART RATE: 72 BPM | DIASTOLIC BLOOD PRESSURE: 82 MMHG | WEIGHT: 200 LBS | RESPIRATION RATE: 17 BRPM

## 2019-10-07 DIAGNOSIS — L98.9 DISORDER OF THE SKIN AND SUBCUTANEOUS TISSUE, UNSPECIFIED: ICD-10-CM

## 2019-10-07 PROCEDURE — 99204 OFFICE O/P NEW MOD 45 MIN: CPT

## 2019-10-17 ENCOUNTER — RESULT REVIEW (OUTPATIENT)
Age: 79
End: 2019-10-17

## 2019-10-17 ENCOUNTER — OUTPATIENT (OUTPATIENT)
Dept: OUTPATIENT SERVICES | Facility: HOSPITAL | Age: 79
LOS: 1 days | End: 2019-10-17
Payer: MEDICARE

## 2019-10-17 DIAGNOSIS — C43.9 MALIGNANT MELANOMA OF SKIN, UNSPECIFIED: ICD-10-CM

## 2019-10-17 PROCEDURE — 88321 CONSLTJ&REPRT SLD PREP ELSWR: CPT

## 2019-10-22 LAB — SURGICAL PATHOLOGY STUDY: SIGNIFICANT CHANGE UP

## 2019-11-01 ENCOUNTER — INPATIENT (INPATIENT)
Facility: HOSPITAL | Age: 79
LOS: 4 days | Discharge: NURSING, REHAB OR LONG-TERM CARE | DRG: 184 | End: 2019-11-06
Attending: INTERNAL MEDICINE | Admitting: INTERNAL MEDICINE
Payer: MEDICARE

## 2019-11-01 VITALS
OXYGEN SATURATION: 96 % | RESPIRATION RATE: 18 BRPM | SYSTOLIC BLOOD PRESSURE: 198 MMHG | HEART RATE: 64 BPM | WEIGHT: 205.03 LBS | DIASTOLIC BLOOD PRESSURE: 101 MMHG | HEIGHT: 69 IN | TEMPERATURE: 98 F

## 2019-11-01 DIAGNOSIS — S22.41XA MULTIPLE FRACTURES OF RIBS, RIGHT SIDE, INITIAL ENCOUNTER FOR CLOSED FRACTURE: ICD-10-CM

## 2019-11-01 LAB
ALBUMIN SERPL ELPH-MCNC: 3.5 G/DL — SIGNIFICANT CHANGE UP (ref 3.3–5)
ALP SERPL-CCNC: 72 U/L — SIGNIFICANT CHANGE UP (ref 40–120)
ALT FLD-CCNC: 12 U/L — SIGNIFICANT CHANGE UP (ref 10–45)
ANION GAP SERPL CALC-SCNC: 9 MMOL/L — SIGNIFICANT CHANGE UP (ref 5–17)
APPEARANCE UR: CLEAR — SIGNIFICANT CHANGE UP
APTT BLD: 39 SEC — HIGH (ref 27.5–36.3)
AST SERPL-CCNC: 24 U/L — SIGNIFICANT CHANGE UP (ref 10–40)
BASOPHILS # BLD AUTO: 0.08 K/UL — SIGNIFICANT CHANGE UP (ref 0–0.2)
BASOPHILS NFR BLD AUTO: 0.7 % — SIGNIFICANT CHANGE UP (ref 0–2)
BILIRUB SERPL-MCNC: 0.6 MG/DL — SIGNIFICANT CHANGE UP (ref 0.2–1.2)
BILIRUB UR-MCNC: NEGATIVE — SIGNIFICANT CHANGE UP
BUN SERPL-MCNC: 18 MG/DL — SIGNIFICANT CHANGE UP (ref 7–23)
CALCIUM SERPL-MCNC: 8.5 MG/DL — SIGNIFICANT CHANGE UP (ref 8.4–10.5)
CHLORIDE SERPL-SCNC: 105 MMOL/L — SIGNIFICANT CHANGE UP (ref 96–108)
CO2 SERPL-SCNC: 23 MMOL/L — SIGNIFICANT CHANGE UP (ref 22–31)
COLOR SPEC: SIGNIFICANT CHANGE UP
CREAT SERPL-MCNC: 0.84 MG/DL — SIGNIFICANT CHANGE UP (ref 0.5–1.3)
DIFF PNL FLD: ABNORMAL
EOSINOPHIL # BLD AUTO: 0.17 K/UL — SIGNIFICANT CHANGE UP (ref 0–0.5)
EOSINOPHIL NFR BLD AUTO: 1.6 % — SIGNIFICANT CHANGE UP (ref 0–6)
GLUCOSE BLDC GLUCOMTR-MCNC: 133 MG/DL — HIGH (ref 70–99)
GLUCOSE SERPL-MCNC: 85 MG/DL — SIGNIFICANT CHANGE UP (ref 70–99)
GLUCOSE UR QL: ABNORMAL
HCT VFR BLD CALC: 46.4 % — SIGNIFICANT CHANGE UP (ref 39–50)
HGB BLD-MCNC: 15.6 G/DL — SIGNIFICANT CHANGE UP (ref 13–17)
IMM GRANULOCYTES NFR BLD AUTO: 0.2 % — SIGNIFICANT CHANGE UP (ref 0–1.5)
INR BLD: 1.95 RATIO — HIGH (ref 0.88–1.16)
KETONES UR-MCNC: NEGATIVE — SIGNIFICANT CHANGE UP
LEUKOCYTE ESTERASE UR-ACNC: NEGATIVE — SIGNIFICANT CHANGE UP
LYMPHOCYTES # BLD AUTO: 1.74 K/UL — SIGNIFICANT CHANGE UP (ref 1–3.3)
LYMPHOCYTES # BLD AUTO: 16.2 % — SIGNIFICANT CHANGE UP (ref 13–44)
MCHC RBC-ENTMCNC: 29.8 PG — SIGNIFICANT CHANGE UP (ref 27–34)
MCHC RBC-ENTMCNC: 33.6 GM/DL — SIGNIFICANT CHANGE UP (ref 32–36)
MCV RBC AUTO: 88.5 FL — SIGNIFICANT CHANGE UP (ref 80–100)
MONOCYTES # BLD AUTO: 0.84 K/UL — SIGNIFICANT CHANGE UP (ref 0–0.9)
MONOCYTES NFR BLD AUTO: 7.8 % — SIGNIFICANT CHANGE UP (ref 2–14)
NEUTROPHILS # BLD AUTO: 7.86 K/UL — HIGH (ref 1.8–7.4)
NEUTROPHILS NFR BLD AUTO: 73.5 % — SIGNIFICANT CHANGE UP (ref 43–77)
NITRITE UR-MCNC: NEGATIVE — SIGNIFICANT CHANGE UP
NRBC # BLD: 0 /100 WBCS — SIGNIFICANT CHANGE UP (ref 0–0)
PH UR: 6.5 — SIGNIFICANT CHANGE UP (ref 5–8)
PLATELET # BLD AUTO: 159 K/UL — SIGNIFICANT CHANGE UP (ref 150–400)
POTASSIUM SERPL-MCNC: 4.6 MMOL/L — SIGNIFICANT CHANGE UP (ref 3.5–5.3)
POTASSIUM SERPL-SCNC: 4.6 MMOL/L — SIGNIFICANT CHANGE UP (ref 3.5–5.3)
PROT SERPL-MCNC: 6.9 G/DL — SIGNIFICANT CHANGE UP (ref 6–8.3)
PROT UR-MCNC: ABNORMAL
PROTHROM AB SERPL-ACNC: 22.7 SEC — HIGH (ref 10–12.9)
RBC # BLD: 5.24 M/UL — SIGNIFICANT CHANGE UP (ref 4.2–5.8)
RBC # FLD: 13.7 % — SIGNIFICANT CHANGE UP (ref 10.3–14.5)
SODIUM SERPL-SCNC: 137 MMOL/L — SIGNIFICANT CHANGE UP (ref 135–145)
SP GR SPEC: 1.01 — SIGNIFICANT CHANGE UP (ref 1.01–1.02)
UROBILINOGEN FLD QL: NEGATIVE — SIGNIFICANT CHANGE UP
WBC # BLD: 10.71 K/UL — HIGH (ref 3.8–10.5)
WBC # FLD AUTO: 10.71 K/UL — HIGH (ref 3.8–10.5)

## 2019-11-01 PROCEDURE — 74177 CT ABD & PELVIS W/CONTRAST: CPT | Mod: 26

## 2019-11-01 PROCEDURE — 71045 X-RAY EXAM CHEST 1 VIEW: CPT | Mod: 26

## 2019-11-01 PROCEDURE — 99285 EMERGENCY DEPT VISIT HI MDM: CPT | Mod: GC

## 2019-11-01 PROCEDURE — 71260 CT THORAX DX C+: CPT | Mod: 26

## 2019-11-01 PROCEDURE — 70450 CT HEAD/BRAIN W/O DYE: CPT | Mod: 26

## 2019-11-01 RX ORDER — LOSARTAN POTASSIUM 100 MG/1
50 TABLET, FILM COATED ORAL ONCE
Refills: 0 | Status: COMPLETED | OUTPATIENT
Start: 2019-11-01 | End: 2019-11-01

## 2019-11-01 RX ORDER — INFLUENZA VIRUS VACCINE 15; 15; 15; 15 UG/.5ML; UG/.5ML; UG/.5ML; UG/.5ML
0.5 SUSPENSION INTRAMUSCULAR ONCE
Refills: 0 | Status: COMPLETED | OUTPATIENT
Start: 2019-11-01 | End: 2019-11-06

## 2019-11-01 RX ORDER — OXYCODONE HYDROCHLORIDE 5 MG/1
5 TABLET ORAL ONCE
Refills: 0 | Status: DISCONTINUED | OUTPATIENT
Start: 2019-11-01 | End: 2019-11-01

## 2019-11-01 RX ORDER — MORPHINE SULFATE 50 MG/1
2 CAPSULE, EXTENDED RELEASE ORAL ONCE
Refills: 0 | Status: DISCONTINUED | OUTPATIENT
Start: 2019-11-01 | End: 2019-11-01

## 2019-11-01 RX ORDER — LIDOCAINE 4 G/100G
1 CREAM TOPICAL ONCE
Refills: 0 | Status: COMPLETED | OUTPATIENT
Start: 2019-11-01 | End: 2019-11-01

## 2019-11-01 RX ORDER — ACETAMINOPHEN 500 MG
975 TABLET ORAL ONCE
Refills: 0 | Status: COMPLETED | OUTPATIENT
Start: 2019-11-01 | End: 2019-11-01

## 2019-11-01 RX ADMIN — OXYCODONE HYDROCHLORIDE 5 MILLIGRAM(S): 5 TABLET ORAL at 14:59

## 2019-11-01 RX ADMIN — LIDOCAINE 1 PATCH: 4 CREAM TOPICAL at 15:00

## 2019-11-01 RX ADMIN — LOSARTAN POTASSIUM 50 MILLIGRAM(S): 100 TABLET, FILM COATED ORAL at 18:34

## 2019-11-01 RX ADMIN — Medication 975 MILLIGRAM(S): at 15:00

## 2019-11-01 RX ADMIN — MORPHINE SULFATE 2 MILLIGRAM(S): 50 CAPSULE, EXTENDED RELEASE ORAL at 18:34

## 2019-11-01 NOTE — CONSULT NOTE ADULT - SUBJECTIVE AND OBJECTIVE BOX
TRAUMA SERVICE (Acute Care Surgery / [facility] - #0744) - CONSULT NOTE  --------------------------------------------------------------------------------------------    TRAUMA ACTIVATION LEVEL:     MECHANISM OF INJURY:      [x] Blunt  	[] MVC	[x] Fall	[] Pedestrian Struck	[] Motorcycle accident      [] Penetrating  	[] Gun Shot Wound 		[] Stab Wound    GCS: 	E: 4	V: 5	M: 6      HPI:   Patient is a 78y old male w/ PMH of HLD, DM, CAD, hx of unknown heart surgery, on plavix and xarelto presents w/ right sided chest pain. Pt reports he fell 5 days ago while in the bathroom. He reports he felt fine after, but started developing significant right sided chest pain. He denies LOC or hitting his head with the fall. He is only complaining of right sided chest pain.     ***    Primary Survey:  ***  A - airway intact  B - bilateral breath sounds and good chest rise  C - initial BP: 175/87 (19 @ 19:37) , HR: 61 (19 @ 19:37) , palpable pulses in all extremities  D - GCS 15 on arrival  Exposure obtained      Secondary Survey: ***  General: NAD  HEENT: Normocephalic, atraumatic, EOMI, PEERLA.  Neck: Soft, midline trachea, C-collar in place  Chest: R sided chest wall tenderness  Cardiac: S1, S2, RRR  Respiratory: Bilateral breath sounds, clear and equal bilaterally  Abdomen: Soft, non-distended, non-tender, no rebound, no guarding, no masses palpated  Pelvis: Stable, non-tender, no ecchymosis  Ext: palp radial b/l UE, b/l DP palp in Lower Extrem, motor and sensory grossly intact in all 4 extremities  Back: no TTP, no palpable stepoff/deformity    Patient denies fevers/chills, denies lightheadedness/dizziness, denies SOB/chest pain, denies nausea/vomiting, denies constipation/diarrhea.  ***    ROS: 10-system review is otherwise negative except HPI above.      PAST MEDICAL & SURGICAL HISTORY:  Hyperlipidemia  Hypertension  Diabetes mellitus  Hx of smoking  Obesity (BMI 30-39.9)  DVT (deep venous thrombosis): right leg 8 yrs ago  Cholecystitis chronic, acute  HLD (hyperlipidemia)  DM (diabetes mellitus)  Anxiety  Depression  HTN (hypertension)  PVD (peripheral vascular disease)  CAD (coronary artery disease)  No significant past surgical history  S/P appendectomy:   S/P CABG (coronary artery bypass graft)    FAMILY HISTORY:  No pertinent family history in first degree relatives    [] Family history not pertinent as reviewed with the patient and family    SOCIAL HISTORY:  ***    ALLERGIES: dust (Rhinorrhea)  No Known Drug Allergies  ramipril (Other)      HOME MEDICATIONS: ***    CURRENT MEDICATIONS  MEDICATIONS (STANDING):   MEDICATIONS (PRN):  --------------------------------------------------------------------------------------------    Vitals:   T(C): 36.8 (19 @ 18:30), Max: 36.9 (19 @ 13:45)  HR: 61 (19 @ 19:37) (61 - 65)  BP: 175/87 (19 @ 19:37) (175/87 - 205/94)  RR: 18 (19 @ 19:37) (16 - 18)  SpO2: 95% (19 19:37) (95% - 97%)  CAPILLARY BLOOD GLUCOSE        CAPILLARY BLOOD GLUCOSE          Height (cm): 175.26 (:45)  Weight (kg): 93 (:45)  BMI (kg/m2): 30.3 (:45)  BSA (m2): 2.09 (:45)    PHYSICAL EXAM: ***  General: NAD  HEENT: Normocephalic, atraumatic, EOMI, PEERLA.  Neck: Soft, midline trachea, C-collar in place  Chest: R sided chest wall tenderness  Cardiac: S1, S2, RRR  Respiratory: Bilateral breath sounds, clear and equal bilaterally  Abdomen: Soft, non-distended, non-tender, no rebound, no guarding, no masses palpated  Pelvis: Stable, non-tender, no ecchymosis  Ext: palp radial b/l UE, b/l DP palp in Lower Extrem, motor and sensory grossly intact in all 4 extremities  Back: no TTP, no palpable stepoff/deformity    --------------------------------------------------------------------------------------------    LABS  CBC ( @ 14:53)                              15.6                           10.71<H>  )----------------(  159        73.5  % Neutrophils, 16.2  % Lymphocytes, ANC: 7.86<H>                              46.4      BMP ( @ 14:53)             137     |  105     |  18    		Ca++ --      Ca 8.5                ---------------------------------( 85    		Mg --                 4.6     |  23      |  0.84  			Ph --        LFTs ( @ 14:53)      TPro 6.9 / Alb 3.5 / TBili 0.6 / DBili -- / AST 24 / ALT 12 / AlkPhos 72    Coags ( @ 14:53)  aPTT 39.0<H> / INR 1.95<H> / PT 22.7<H>          --------------------------------------------------------------------------------------------    MICROBIOLOGY  Urinalysis ( @ 14:53):     Color: Light Yellow / Appearance: Clear / S.013 / pH: 6.5 / Gluc: Trace<!> / Ketones: Negative / Bili: Negative / Urobili: Negative / Protein :100 mg/dL<!> / Nitrites: Negative / Leuk.Est: Negative / RBC: 16<H> / WBC: 1 / Sq Epi:  / Non Sq Epi: 0 / Bacteria Negative         --------------------------------------------------------------------------------------------    IMAGING  < from: CT Chest w/ IV Cont (19 @ 16:36) >  IMPRESSION:     Mildly displaced fractures of the right eighth and ninth lateral ribs.    Small bilateral pleural effusions, right greater than left.    No solid visceral injury.    Indeterminate subpleural 0.4 cm right upper lobe nodule. Follow-up chest   CT is recommended in 3 months to assess for stability.    Nonspecific mediastinal lymphadenopathy of uncertain etiology.    < end of copied text >    < from: CT Head No Cont (19 @ 16:32) >  IMPRESSION:    No acute intracranial hemorrhage or mass effect. No displaced calvarial   fracture.    < end of copied text >  < from: Xray Chest 1 View- PORTABLE-Urgent (19 @ 16:29) >  IMPRESSION:    Clear lungs.    < end of copied text >    --------------------------------------------------------------------------------------------

## 2019-11-01 NOTE — ED ADULT NURSE REASSESSMENT NOTE - NS ED NURSE REASSESS COMMENT FT1
Patient lying in bed with family on bedside. Patient states he is in pain- morphine given about 30 minutes prior. Side rails up. Assessed VS.

## 2019-11-01 NOTE — H&P ADULT - NSICDXPASTSURGICALHX_GEN_ALL_CORE_FT
PAST SURGICAL HISTORY:  No significant past surgical history     S/P appendectomy 1952    S/P CABG (coronary artery bypass graft)

## 2019-11-01 NOTE — ED PROVIDER NOTE - PMH
Anxiety    CAD (coronary artery disease)    Cholecystitis chronic, acute    Depression    Diabetes mellitus    DM (diabetes mellitus)    DVT (deep venous thrombosis)  right leg 8 yrs ago  HLD (hyperlipidemia)    HTN (hypertension)    Hx of smoking    Hyperlipidemia    Hypertension    Obesity (BMI 30-39.9)    PVD (peripheral vascular disease)

## 2019-11-01 NOTE — ED ADULT NURSE REASSESSMENT NOTE - NS ED NURSE REASSESS COMMENT FT1
Patient complained of difficulty urinating, per Dr. Valdez bladder scan performed. 163 ml retained. Dr. Valdez notified.

## 2019-11-01 NOTE — ED PROVIDER NOTE - NS ED ROS FT
Gen: Denies fever, weight loss  CV: denies chest pain, palpitations  HEENT: Denies neck pain, sore throat, eye discharge  Skin: Denies rash, erythema, color changes  Resp: Denies SOB, cough  Endo: Denies sensitivity to heat, cold, increased urination  GI: Denies constipation, nausea, vomiting  Msk: Denies back pain, LE swelling, extremity pain  : Denies dysuria, increased frequency  Neuro: Denies LOC, weakness, seizures  Psych: Denies hx of psych, hallucinations, SI

## 2019-11-01 NOTE — ED PROVIDER NOTE - PHYSICAL EXAMINATION
Gen: Alert and oriented. Lying comfortably in bed. Answering questions appropriately  HEENT: extra occular movements intact, no nasal discharge, mucous membranes moist  CV: Regular rate and rhythm, +S1/S2, no murmurs/rubs/gallops,   Resp: Clear to ausculation bilaterally, no wheezes/rhonchi/rales  GI: Abdomen distended, non tender to palpation  MSK: Tender to palpation at right side of chest. No open wounds, no bruising, no LE edema, Homans sign negative bl  Neuro: A&Ox4, following commands, moving all four extremities spontaneously  Psych: appropriate mood

## 2019-11-01 NOTE — ED ADULT NURSE REASSESSMENT NOTE - NS ED NURSE REASSESS COMMENT FT1
Patient returned from CT, resting comfortably in bed. Was taken to the bathroom, ambulating with one assist. A&Ox3. Awaiting results. Side rail up, call bell within reach, safety maintained.

## 2019-11-01 NOTE — ED ADULT NURSE NOTE - OBJECTIVE STATEMENT
77 y/o M pt w/ pmh of HTN, DM, present to ED from urgent care for right rib pain, pt fell 5 days ago getting up from the commode in the bathroom and hot right side against tub, neg LOC, pt is on Xarelto, pain is not going away, went to urgent care where he was found to be hypertensive, had CXR which show right rib fx, and ventral incisional hernia, sent to ED, pt received 5mg of morphine IVP by EMS, on exam A&Ox3, breathing spontaneous, unlabored w/o distress on room air, lungs b/l CTA, abd soft, distended, tenderness to RUQ, swelling to RUQ by incision, last BM was yesterday, before 6 days ago, + BS in all 4 quadrant, neg CVA tenderness, denies N/V/D, fever, chills, dysuria, hematuria, melena, seen and eval by MD, heplock placed, labs drawn and sent

## 2019-11-01 NOTE — ED ADULT NURSE REASSESSMENT NOTE - NS ED NURSE REASSESS COMMENT FT1
Patient seen in bed resting comfortably Patient is awake, alert, a&ox3. Complains of pain, 9/10, morphine 2mg IV given tolerated well. Losartan PO administered for high BP, tolerated well. Per Dr. Valdez, peak flow test was administered, peak flow was 200. Dr. Valdez notified. Awaiting dispo, side rails up safety bell within reach, safety maintained.

## 2019-11-01 NOTE — H&P ADULT - HISTORY OF PRESENT ILLNESS
79 yo male with hx of HTN, HLD, CAD with history of open heart surgery on anticoagulation and history of open cholecystectomy who presents sp mechanical fall 5 days ago. Patient states he was in the shower and hit his ribs on the ledge getting out of the shower. Did not hit his head or lose consciousness. Originally, felt well but over the past few days has had severe, stabbing pain in the right side of chest. [facility] which showed 3 rib fractures. Was given 5 mg of morphine via EMS on way here today with improvement. Denies headache, nausea, vomiting.

## 2019-11-01 NOTE — H&P ADULT - NSICDXPASTMEDICALHX_GEN_ALL_CORE_FT
PAST MEDICAL HISTORY:  Anxiety     CAD (coronary artery disease)     Cholecystitis chronic, acute     Depression     Diabetes mellitus     DM (diabetes mellitus)     DVT (deep venous thrombosis) right leg 8 yrs ago    HLD (hyperlipidemia)     HTN (hypertension)     Hx of smoking     Hyperlipidemia     Hypertension     Obesity (BMI 30-39.9)     PVD (peripheral vascular disease)

## 2019-11-01 NOTE — H&P ADULT - ASSESSMENT
77 yo male s/p mechanical fall, now w 2 right rib fx and nonspecific findings of a RUL lung nodule, which needs to be followed up on w rpt CT in 3 mo. Pain control to prevent splinting and atelectasis. Will place on oxycodone ER and prn Oxycodone IR for Mod pain and Morphine for severe pain. miralax prn constipation, cont outptn meds

## 2019-11-01 NOTE — ED PROVIDER NOTE - CLINICAL SUMMARY MEDICAL DECISION MAKING FREE TEXT BOX
Attending MD Forrest: 78M with CAD, HTN presenting as referral from urgent care for right sided chest wall pain sp fall from standing 1 week prior, exam with ttp right lateral chest wall, concern for rib fx's. Plan for CT chest to eval for rib fx's, CT a/p to r/o associated solid organ injury, CT head given advanced age though no head trauma. Cervical spine cleared clinically of fracture without need for imaging according to Nexus Criteria. Analgesia and reassess

## 2019-11-01 NOTE — H&P ADULT - NSHPPHYSICALEXAM_GEN_ALL_CORE
T(F): 98 (11-01-19 @ 21:39), Max: 98.4 (11-01-19 @ 13:45)  HR: 78 (11-01-19 @ 21:39) (61 - 78)  BP: 176/76 (11-01-19 @ 21:39) (175/87 - 205/94)  RR: 18 (11-01-19 @ 21:39) (16 - 18)  SpO2: 94% (11-01-19 @ 21:39) (94% - 97%)    GENERAL: NAD, well-developed  HEAD:  Atraumatic, Normocephalic  EYES: EOMI, PERRLA, conjunctiva and sclera clear  NECK: Supple, No JVD  CHEST/LUNG: Clear to auscultation bilaterally; No wheeze  HEART: Regular rate and rhythm; No murmurs, rubs, or gallops  ABDOMEN: Soft, Nontender, Nondistended; Bowel sounds present  EXTREMITIES:  2+ Peripheral Pulses, No clubbing, cyanosis, or edema  PSYCH: AAOx3  NEUROLOGY: non-focal  SKIN: No rashes or lesions

## 2019-11-01 NOTE — ED PROVIDER NOTE - OBJECTIVE STATEMENT
79 yo male with hx of HTN, HLD, CAD with history of open heart surgery on anticoagulation and history of open cholecystectomy who presents sp mechanial fall 5 days ago. Patient states he was in the shower and hit his ribs on the ledge jutting out of the shower. Did not hit his head or lose consciousness. Originally, felt well but over the past few days has had severe, stabbing pain in the right side of chest. Went to  today which showed rib fracture. Was given 5 mg of morphine via EMS on way here today with improvement. Denies headache, nausea, vomiting.

## 2019-11-01 NOTE — ED ADULT NURSE NOTE - NSIMPLEMENTINTERV_GEN_ALL_ED
Implemented All Fall with Harm Risk Interventions:  [place] to call system. Call bell, personal items and telephone within reach. Instruct patient to call for assistance. Room bathroom lighting operational. Non-slip footwear when patient is off stretcher. Physically safe environment: no spills, clutter or unnecessary equipment. Stretcher in lowest position, wheels locked, appropriate side rails in place. Provide visual cue, wrist band, yellow gown, etc. Monitor gait and stability. Monitor for mental status changes and reorient to person, place, and time. Review medications for side effects contributing to fall risk. Reinforce activity limits and safety measures with patient and family. Provide visual clues: red socks.

## 2019-11-01 NOTE — ED PROVIDER NOTE - ATTENDING CONTRIBUTION TO CARE
Attending MD Forrest:  I personally have seen and examined this patient.  Resident note reviewed and agree on plan of care and except where noted.  See HPI, PE, and MDM for details.

## 2019-11-02 LAB
CK SERPL-CCNC: 77 U/L — SIGNIFICANT CHANGE UP (ref 30–200)
GLUCOSE BLDC GLUCOMTR-MCNC: 129 MG/DL — HIGH (ref 70–99)
GLUCOSE BLDC GLUCOMTR-MCNC: 135 MG/DL — HIGH (ref 70–99)
GLUCOSE BLDC GLUCOMTR-MCNC: 157 MG/DL — HIGH (ref 70–99)
GLUCOSE BLDC GLUCOMTR-MCNC: 83 MG/DL — SIGNIFICANT CHANGE UP (ref 70–99)
GLUCOSE BLDC GLUCOMTR-MCNC: 95 MG/DL — SIGNIFICANT CHANGE UP (ref 70–99)
TROPONIN T, HIGH SENSITIVITY RESULT: 20 NG/L — SIGNIFICANT CHANGE UP (ref 0–51)

## 2019-11-02 PROCEDURE — 71045 X-RAY EXAM CHEST 1 VIEW: CPT | Mod: 26

## 2019-11-02 RX ORDER — GLUCAGON INJECTION, SOLUTION 0.5 MG/.1ML
1 INJECTION, SOLUTION SUBCUTANEOUS ONCE
Refills: 0 | Status: DISCONTINUED | OUTPATIENT
Start: 2019-11-02 | End: 2019-11-06

## 2019-11-02 RX ORDER — OXYCODONE HYDROCHLORIDE 5 MG/1
15 TABLET ORAL EVERY 12 HOURS
Refills: 0 | Status: DISCONTINUED | OUTPATIENT
Start: 2019-11-02 | End: 2019-11-03

## 2019-11-02 RX ORDER — IPRATROPIUM/ALBUTEROL SULFATE 18-103MCG
3 AEROSOL WITH ADAPTER (GRAM) INHALATION ONCE
Refills: 0 | Status: COMPLETED | OUTPATIENT
Start: 2019-11-02 | End: 2019-11-04

## 2019-11-02 RX ORDER — DEXTROSE 50 % IN WATER 50 %
25 SYRINGE (ML) INTRAVENOUS ONCE
Refills: 0 | Status: DISCONTINUED | OUTPATIENT
Start: 2019-11-02 | End: 2019-11-06

## 2019-11-02 RX ORDER — OXYCODONE HYDROCHLORIDE 5 MG/1
10 TABLET ORAL EVERY 6 HOURS
Refills: 0 | Status: DISCONTINUED | OUTPATIENT
Start: 2019-11-02 | End: 2019-11-05

## 2019-11-02 RX ORDER — INSULIN LISPRO 100/ML
VIAL (ML) SUBCUTANEOUS AT BEDTIME
Refills: 0 | Status: DISCONTINUED | OUTPATIENT
Start: 2019-11-02 | End: 2019-11-06

## 2019-11-02 RX ORDER — SODIUM CHLORIDE 9 MG/ML
1000 INJECTION, SOLUTION INTRAVENOUS
Refills: 0 | Status: DISCONTINUED | OUTPATIENT
Start: 2019-11-02 | End: 2019-11-06

## 2019-11-02 RX ORDER — DEXTROSE 50 % IN WATER 50 %
15 SYRINGE (ML) INTRAVENOUS ONCE
Refills: 0 | Status: DISCONTINUED | OUTPATIENT
Start: 2019-11-02 | End: 2019-11-06

## 2019-11-02 RX ORDER — DEXTROSE 50 % IN WATER 50 %
12.5 SYRINGE (ML) INTRAVENOUS ONCE
Refills: 0 | Status: DISCONTINUED | OUTPATIENT
Start: 2019-11-02 | End: 2019-11-06

## 2019-11-02 RX ORDER — POLYETHYLENE GLYCOL 3350 17 G/17G
17 POWDER, FOR SOLUTION ORAL DAILY
Refills: 0 | Status: DISCONTINUED | OUTPATIENT
Start: 2019-11-02 | End: 2019-11-06

## 2019-11-02 RX ORDER — RIVAROXABAN 15 MG-20MG
20 KIT ORAL
Refills: 0 | Status: DISCONTINUED | OUTPATIENT
Start: 2019-11-02 | End: 2019-11-06

## 2019-11-02 RX ORDER — SERTRALINE 25 MG/1
100 TABLET, FILM COATED ORAL DAILY
Refills: 0 | Status: DISCONTINUED | OUTPATIENT
Start: 2019-11-02 | End: 2019-11-06

## 2019-11-02 RX ORDER — INSULIN GLARGINE 100 [IU]/ML
80 INJECTION, SOLUTION SUBCUTANEOUS AT BEDTIME
Refills: 0 | Status: DISCONTINUED | OUTPATIENT
Start: 2019-11-02 | End: 2019-11-04

## 2019-11-02 RX ORDER — MORPHINE SULFATE 50 MG/1
4 CAPSULE, EXTENDED RELEASE ORAL EVERY 6 HOURS
Refills: 0 | Status: DISCONTINUED | OUTPATIENT
Start: 2019-11-02 | End: 2019-11-03

## 2019-11-02 RX ORDER — ATORVASTATIN CALCIUM 80 MG/1
20 TABLET, FILM COATED ORAL AT BEDTIME
Refills: 0 | Status: DISCONTINUED | OUTPATIENT
Start: 2019-11-02 | End: 2019-11-06

## 2019-11-02 RX ORDER — LOSARTAN POTASSIUM 100 MG/1
50 TABLET, FILM COATED ORAL DAILY
Refills: 0 | Status: DISCONTINUED | OUTPATIENT
Start: 2019-11-02 | End: 2019-11-06

## 2019-11-02 RX ORDER — CLOPIDOGREL BISULFATE 75 MG/1
75 TABLET, FILM COATED ORAL DAILY
Refills: 0 | Status: DISCONTINUED | OUTPATIENT
Start: 2019-11-02 | End: 2019-11-06

## 2019-11-02 RX ORDER — METOPROLOL TARTRATE 50 MG
50 TABLET ORAL DAILY
Refills: 0 | Status: DISCONTINUED | OUTPATIENT
Start: 2019-11-02 | End: 2019-11-04

## 2019-11-02 RX ORDER — ACETAMINOPHEN 500 MG
650 TABLET ORAL EVERY 6 HOURS
Refills: 0 | Status: DISCONTINUED | OUTPATIENT
Start: 2019-11-02 | End: 2019-11-06

## 2019-11-02 RX ORDER — INSULIN LISPRO 100/ML
VIAL (ML) SUBCUTANEOUS
Refills: 0 | Status: DISCONTINUED | OUTPATIENT
Start: 2019-11-02 | End: 2019-11-06

## 2019-11-02 RX ORDER — DIAZEPAM 5 MG
5 TABLET ORAL DAILY
Refills: 0 | Status: DISCONTINUED | OUTPATIENT
Start: 2019-11-02 | End: 2019-11-06

## 2019-11-02 RX ADMIN — OXYCODONE HYDROCHLORIDE 15 MILLIGRAM(S): 5 TABLET ORAL at 08:58

## 2019-11-02 RX ADMIN — Medication 975 MILLIGRAM(S): at 15:30

## 2019-11-02 RX ADMIN — POLYETHYLENE GLYCOL 3350 17 GRAM(S): 17 POWDER, FOR SOLUTION ORAL at 06:24

## 2019-11-02 RX ADMIN — OXYCODONE HYDROCHLORIDE 10 MILLIGRAM(S): 5 TABLET ORAL at 01:45

## 2019-11-02 RX ADMIN — OXYCODONE HYDROCHLORIDE 10 MILLIGRAM(S): 5 TABLET ORAL at 23:27

## 2019-11-02 RX ADMIN — RIVAROXABAN 20 MILLIGRAM(S): KIT at 17:22

## 2019-11-02 RX ADMIN — OXYCODONE HYDROCHLORIDE 10 MILLIGRAM(S): 5 TABLET ORAL at 01:15

## 2019-11-02 RX ADMIN — OXYCODONE HYDROCHLORIDE 10 MILLIGRAM(S): 5 TABLET ORAL at 22:57

## 2019-11-02 RX ADMIN — ATORVASTATIN CALCIUM 20 MILLIGRAM(S): 80 TABLET, FILM COATED ORAL at 22:08

## 2019-11-02 RX ADMIN — OXYCODONE HYDROCHLORIDE 10 MILLIGRAM(S): 5 TABLET ORAL at 12:50

## 2019-11-02 RX ADMIN — OXYCODONE HYDROCHLORIDE 15 MILLIGRAM(S): 5 TABLET ORAL at 19:19

## 2019-11-02 RX ADMIN — OXYCODONE HYDROCHLORIDE 10 MILLIGRAM(S): 5 TABLET ORAL at 11:30

## 2019-11-02 RX ADMIN — Medication 1: at 17:22

## 2019-11-02 RX ADMIN — LIDOCAINE 1 PATCH: 4 CREAM TOPICAL at 03:45

## 2019-11-02 RX ADMIN — INSULIN GLARGINE 80 UNIT(S): 100 INJECTION, SOLUTION SUBCUTANEOUS at 22:08

## 2019-11-02 RX ADMIN — Medication 50 MILLIGRAM(S): at 06:26

## 2019-11-02 RX ADMIN — OXYCODONE HYDROCHLORIDE 15 MILLIGRAM(S): 5 TABLET ORAL at 18:49

## 2019-11-02 RX ADMIN — LOSARTAN POTASSIUM 50 MILLIGRAM(S): 100 TABLET, FILM COATED ORAL at 06:24

## 2019-11-02 RX ADMIN — SERTRALINE 100 MILLIGRAM(S): 25 TABLET, FILM COATED ORAL at 11:30

## 2019-11-02 RX ADMIN — INSULIN GLARGINE 80 UNIT(S): 100 INJECTION, SOLUTION SUBCUTANEOUS at 02:17

## 2019-11-02 RX ADMIN — OXYCODONE HYDROCHLORIDE 15 MILLIGRAM(S): 5 TABLET ORAL at 06:24

## 2019-11-02 RX ADMIN — CLOPIDOGREL BISULFATE 75 MILLIGRAM(S): 75 TABLET, FILM COATED ORAL at 11:30

## 2019-11-02 NOTE — PROGRESS NOTE ADULT - ASSESSMENT
79 yo male s/p mechanical fall, admitted w intractable pain 2/2  2 right rib fx and nonspecific findings of a RUL lung nodule, which needs to be followed up on w rpt CT in 3 mo.  Pain controlled, cont  oxycodone ER and prn Oxycodone IR for breakthrough pain.  miralax prn constipation,   cont outptn meds  on chronic AC  PT eval

## 2019-11-02 NOTE — CONSULT NOTE ADULT - ASSESSMENT
ASSESSMENT: Patient is a 78y old male w/ PMH of HLD, DM, CAD, hx of unknown heart surgery, on plavix and xarelto presents w/ right sided chest pain, found to have minimally displaced L rib fx 8-9.    PLAN:    - No acute trauma surgical intervention  - multimodal pain control - standing tylenol/lidoderm patch/oxycodone PRN  - interval CXR in the am  - tertiary exam in the am    Discussed w/ Dr. Torres  ACS, [id number]
77 yo male s/p  fall, admitted w intractable pain 2/2  2 right rib fx and nonspecific findings of a RUL lung nodule,

## 2019-11-02 NOTE — CONSULT NOTE ADULT - PROBLEM SELECTOR RECOMMENDATION 4
s/p PCI 15 years ago   no follow up thereafter  ASA  TTE   Outpatient SPECT stable  ASA  TTE   Outpatient SPECT

## 2019-11-02 NOTE — CHART NOTE - NSCHARTNOTEFT_GEN_A_CORE
Tertiary Trauma Survey (TTS)    Date of TTS:    2019                          Time: 1415  Admit Date:    2019                            Admit GCS: E- 4     V-5     M- 6    HPI:  79 yo male with hx of HTN, HLD, CAD with history of open heart surgery on anticoagulation and history of open cholecystectomy who presents sp mechanical fall 5 days ago. Patient states he was in the shower and hit his ribs on the ledge getting out of the shower. Did not hit his head or lose consciousness. Originally, felt well but over the past few days has had severe, stabbing pain in the right side of chest. [facility] 3 rib fractures. Was given 5 mg of morphine via EMS on way here today with improvement. Denies headache, nausea, vomiting. (2019 18:44)      Physical Exam:  Obj: Lying in bed comfortably, NAD  Chest: TTP R side consistent with injuries  Abd: soft, ND, NT  Extremities: FROM x4  UE: Bilateral no pain shoulder, humerus, elbow, forearm, wrist, hand; SILT; 5/5 strength  LE: Bilateral no pain hip, femur, knee, tib/fib, foot, toes; SILT; 5/5 strength  Back: c-spine non-ttp; no signs of obvious bruising C to L spine      PAST MEDICAL & SURGICAL HISTORY:  Hyperlipidemia  Hypertension  Diabetes mellitus  Hx of smoking  Obesity (BMI 30-39.9)  DVT (deep venous thrombosis): right leg 8 yrs ago  Cholecystitis chronic, acute  HLD (hyperlipidemia)  DM (diabetes mellitus)  Anxiety  Depression  HTN (hypertension)  PVD (peripheral vascular disease)  CAD (coronary artery disease)  No significant past surgical history  S/P appendectomy:   S/P CABG (coronary artery bypass graft)    [  ] No significant past history as reviewed with the patient and family    FAMILY HISTORY:  No pertinent family history in first degree relatives    [  ] Family history not pertinent as reviewed with the patient and family    SOCIAL HISTORY:    Medications (inpatient): albuterol/ipratropium for Nebulization. 3 milliLiter(s) Nebulizer once  atorvastatin 20 milliGRAM(s) Oral at bedtime  clopidogrel Tablet 75 milliGRAM(s) Oral daily  dextrose 5%. 1000 milliLiter(s) IV Continuous <Continuous>  dextrose 50% Injectable 12.5 Gram(s) IV Push once  dextrose 50% Injectable 25 Gram(s) IV Push once  dextrose 50% Injectable 25 Gram(s) IV Push once  influenza   Vaccine 0.5 milliLiter(s) IntraMuscular once  insulin glargine Injectable (LANTUS) 80 Unit(s) SubCutaneous at bedtime  insulin lispro (HumaLOG) corrective regimen sliding scale   SubCutaneous three times a day before meals  insulin lispro (HumaLOG) corrective regimen sliding scale   SubCutaneous at bedtime  losartan 50 milliGRAM(s) Oral daily  metoprolol succinate ER 50 milliGRAM(s) Oral daily  oxyCODONE  ER Tablet 15 milliGRAM(s) Oral every 12 hours  rivaroxaban 20 milliGRAM(s) Oral with dinner  sertraline 100 milliGRAM(s) Oral daily    Medications (PRN):acetaminophen   Tablet .. 650 milliGRAM(s) Oral every 6 hours PRN  dextrose 40% Gel 15 Gram(s) Oral once PRN  diazepam    Tablet 5 milliGRAM(s) Oral daily PRN  glucagon  Injectable 1 milliGRAM(s) IntraMuscular once PRN  morphine  - Injectable 4 milliGRAM(s) IV Push every 6 hours PRN  oxyCODONE    IR 10 milliGRAM(s) Oral every 6 hours PRN  polyethylene glycol 3350 17 Gram(s) Oral daily PRN    Allergies: dust (Rhinorrhea)  No Known Drug Allergies  (Intolerances: ramipril (Other)  )    Vital Signs Last 24 Hrs  T(C): 37 (2019 12:10), Max: 37 (2019 12:10)  T(F): 98.6 (2019 12:10), Max: 98.6 (2019 12:10)  HR: 45 (2019 12:10) (45 - 78)  BP: 154/73 (2019 12:10) (154/73 - 188/84)  BP(mean): --  RR: 19 (2019 12:10) (18 - 19)  SpO2: 91% (2019 12:10) (91% - 97%)  Drug Dosing Weight  Height (cm): 175.3 (2019 21:39)  Weight (kg): 93.3 (2019 21:39)  BMI (kg/m2): 30.4 (2019 21:39)  BSA (m2): 2.09 (2019 21:39)                          15.6   10.71 )-----------( 159      ( 2019 14:53 )             46.4         137  |  105  |  18  ----------------------------<  85  4.6   |  23  |  0.84    Ca    8.5      2019 14:53    TPro  6.9  /  Alb  3.5  /  TBili  0.6  /  DBili  x   /  AST  24  /  ALT  12  /  AlkPhos  72      PT/INR - ( 2019 14:53 )   PT: 22.7 sec;   INR: 1.95 ratio         PTT - ( 2019 14:53 )  PTT:39.0 sec  Urinalysis Basic - ( 2019 14:53 )    Color: Light Yellow / Appearance: Clear / S.013 / pH: x  Gluc: x / Ketone: Negative  / Bili: Negative / Urobili: Negative   Blood: x / Protein: 100 mg/dL / Nitrite: Negative   Leuk Esterase: Negative / RBC: 16 /hpf / WBC 1 /HPF   Sq Epi: x / Non Sq Epi: 0 /hpf / Bacteria: Negative        List Injuries Identified to Date:  Minimally displaced L rib fx 8-9.        Consults (Date):  [  ] Neurosurgery   [  ] Orthopedics  [  ] Plastics  [  ] Urology  [  ] PM&R  [  ] Social Work  [ x ] Trauma    RADIOLOGICAL FINDINGS REVIEW:  PROCEDURE DATE: 2019   INTERPRETATION: XR CHEST AP view  COMPARISON: 2019   FINDINGS/   IMPRESSION:   Clear lungs. No pleural effusion or pneumothorax. Age indeterminant fracture   deformity of the left ribs.   Status post sternotomy and CABG.    CT:    EXAM: CT ABDOMEN AND PELVIS IC   PROCEDURE DATE: 2019     INTERPRETATION: CLINICAL INFORMATION: Blunt abdominal trauma with right rib   pain status post mechanical fall 5 days ago.     COMPARISON: Prior abdominal CT dated 2013.     PROCEDURE:   CT of the Chest, Abdomen and Pelvis was performed with intravenous contrast.   Imaging was performed through the chest in the arterial phase followed by   imaging of the abdomen and pelvis in the portal venous phase.   Intravenous contrast: 90 ml Omnipaque 350. 10 ml discarded.   Oral contrast: None.   Sagittal and coronal reformats were performed.     FINDINGS:     CHEST:     LUNGS AND LARGE AIRWAYS: Patent central airways. Subpleural 0.4 cm right   upper lobe nodule (2:20). Patchy groundglass opacities laterally. Calcified   granuloma in the right upper lobe.   PLEURA: Small bilateral pleural effusions, right greater than left.   VESSELS: Atherosclerotic calcification of the aorta and coronary arteries.   HEART: Heart size is normal. No pericardial effusion.   MEDIASTINUM AND BHANU: Mediastinal lymph nodes. For example, a right   paratracheal lymph node measures 1.6 x 1.6 cm (2:26).   CHEST WALL AND LOWER NECK: Status post sternotomy.     ABDOMEN AND PELVIS:     LIVER: Within normal limits. A 0.9 cm soft tissue density at the inferior   aspect of the liver (3:42) is unchanged since 2013.   BILE DUCTS: Normal caliber.   GALLBLADDER: Status post cholecystectomy.   SPLEEN: Within normal limits.   PANCREAS: Within normal limits.   ADRENALS: Within normal limits.   KIDNEYS/URETERS: No hydronephrosis. A subcentimeter hypodense focus in the   lower pole of the right kidney is too small to characterize.     BLADDER: Within normal limits.   REPRODUCTIVE ORGANS: Prostate gland is mildly enlarged.     BOWEL: No bowel obstruction. Sigmoid diverticulosis.   PERITONEUM: No ascites.   VESSELS: Atherosclerotic calcification.   RETROPERITONEUM/LYMPH NODES: No lymphadenopathy.   ABDOMINAL WALL: Small fat-containing umbilical hernia.   BONES: Mildly displaced fractures of the right eighth and ninth lateral   ribs. Old left rib fractures. Degenerative changes in the spine.     IMPRESSION:     Mildly displaced fractures of the right eighth and ninth lateral ribs.     Small bilateral pleural effusions, right greater than left.     No solid visceral injury.     Indeterminate subpleural 0.4 cm right upper lobe nodule. Follow-up chest CT   is recommended in 3 months to assess for stability.     Nonspecific mediastinal lymphadenopathy of uncertain etiology.       Assessment: Patient is a 78y old male w/ PMH of HLD, DM, CAD, hx of unknown heart surgery, on plavix and xarelto presents w/ right sided chest pain, found to have minimally displaced L rib fx 8-9.  At this time, there does not appear to be any new injuries / concerns based on TTP.    Plan:   - No surgical intervention at this time  - No new injuries per TTP  - Trauma surgery signing off for now; please re-consult with any new concerns

## 2019-11-02 NOTE — CONSULT NOTE ADULT - SUBJECTIVE AND OBJECTIVE BOX
CHIEF COMPLAINT: Syncope       77 yo male with hx of HTN, HLD, CAD with history of open heart surgery on anticoagulation and history of open cholecystectomy who presents sp mechanical fall 5 days ago. Patient states he was in the shower and hit his ribs on the ledge getting out of the shower. Did not hit his head or lose consciousness. Originally, felt well but over the past few days has had severe, stabbing pain in the right side of chest. Went to [facility] center today which showed 3 rib fractures. Was given 5 mg of morphine via EMS on way here today with improvement. Denies headache, nausea, vomiting.          HISTORY OF PRESENT ILLNESS:      PAST MEDICAL & SURGICAL HISTORY:  Hyperlipidemia  Hypertension  Diabetes mellitus  Hx of smoking  Obesity (BMI 30-39.9)  DVT (deep venous thrombosis): right leg 8 yrs ago  Cholecystitis chronic, acute  HLD (hyperlipidemia)  DM (diabetes mellitus)  Anxiety  Depression  HTN (hypertension)  PVD (peripheral vascular disease)  CAD (coronary artery disease)  No significant past surgical history  S/P appendectomy: 1952  S/P CABG (coronary artery bypass graft)          MEDICATIONS:  clopidogrel Tablet 75 milliGRAM(s) Oral daily  losartan 50 milliGRAM(s) Oral daily  metoprolol succinate ER 50 milliGRAM(s) Oral daily  rivaroxaban 20 milliGRAM(s) Oral with dinner      albuterol/ipratropium for Nebulization. 3 milliLiter(s) Nebulizer once    acetaminophen   Tablet .. 650 milliGRAM(s) Oral every 6 hours PRN  diazepam    Tablet 5 milliGRAM(s) Oral daily PRN  morphine  - Injectable 4 milliGRAM(s) IV Push every 6 hours PRN  oxyCODONE    IR 10 milliGRAM(s) Oral every 6 hours PRN  oxyCODONE  ER Tablet 15 milliGRAM(s) Oral every 12 hours  sertraline 100 milliGRAM(s) Oral daily    polyethylene glycol 3350 17 Gram(s) Oral daily PRN    atorvastatin 20 milliGRAM(s) Oral at bedtime  dextrose 40% Gel 15 Gram(s) Oral once PRN  dextrose 50% Injectable 12.5 Gram(s) IV Push once  dextrose 50% Injectable 25 Gram(s) IV Push once  dextrose 50% Injectable 25 Gram(s) IV Push once  glucagon  Injectable 1 milliGRAM(s) IntraMuscular once PRN  insulin glargine Injectable (LANTUS) 80 Unit(s) SubCutaneous at bedtime  insulin lispro (HumaLOG) corrective regimen sliding scale   SubCutaneous three times a day before meals  insulin lispro (HumaLOG) corrective regimen sliding scale   SubCutaneous at bedtime    dextrose 5%. 1000 milliLiter(s) IV Continuous <Continuous>  influenza   Vaccine 0.5 milliLiter(s) IntraMuscular once      FAMILY HISTORY:  No pertinent family history in first degree relatives      SOCIAL HISTORY:    [ ] Non-smoker  [ ] Smoker  [ ] Alcohol    Allergies    dust (Rhinorrhea)  No Known Drug Allergies    Intolerances    ramipril (Other)  	    REVIEW OF SYSTEMS:  CONSTITUTIONAL: No fever, weight loss, or fatigue  EYES: No eye pain, visual disturbances, or discharge  ENMT:  No difficulty hearing, tinnitus, vertigo; No sinus or throat pain  NECK: No pain or stiffness  RESPIRATORY: No cough, wheezing, chills or hemoptysis; No Shortness of Breath  CARDIOVASCULAR: No chest pain, palpitations, passing out, dizziness, or leg swelling  GASTROINTESTINAL: No abdominal or epigastric pain. No nausea, vomiting, or hematemesis; No diarrhea or constipation. No melena or hematochezia.  GENITOURINARY: No dysuria, frequency, hematuria, or incontinence  NEUROLOGICAL: No headaches, memory loss, loss of strength, numbness, or tremors  SKIN: No itching, burning, rashes, or lesions   LYMPH Nodes: No enlarged glands  ENDOCRINE: No heat or cold intolerance; No hair loss  MUSCULOSKELETAL: No joint pain or swelling; No muscle, back, or extremity pain  PSYCHIATRIC: No depression, anxiety, mood swings, or difficulty sleeping  HEME/LYMPH: No easy bruising, or bleeding gums  ALLERY AND IMMUNOLOGIC: No hives or eczema	    [ ] All others negative	  [ ] Unable to obtain    PHYSICAL EXAM:  T(C): 37 (11-02-19 @ 12:10), Max: 37 (11-02-19 @ 12:10)  HR: 45 (11-02-19 @ 12:10) (45 - 78)  BP: 154/73 (11-02-19 @ 12:10) (154/73 - 176/76)  RR: 19 (11-02-19 @ 12:10) (18 - 19)  SpO2: 91% (11-02-19 @ 12:10) (91% - 94%)  Wt(kg): --  I&O's Summary    01 Nov 2019 07:01  -  02 Nov 2019 07:00  --------------------------------------------------------  IN: 180 mL / OUT: 300 mL / NET: -120 mL    02 Nov 2019 08:01  -  02 Nov 2019 20:58  --------------------------------------------------------  IN: 240 mL / OUT: 0 mL / NET: 240 mL        Appearance: Normal	  HEENT:   Normal oral mucosa, PERRL, EOMI	  Lymphatic: No lymphadenopathy  Cardiovascular: Normal S1 S2, No JVD, No murmurs, No edema  Respiratory: Lungs clear to auscultation	  Psychiatry: A & O x 3, Mood & affect appropriate  Gastrointestinal:  Soft, Non-tender, + BS	  Skin: No rashes, No ecchymoses, No cyanosis	  Neurologic: Non-focal  Extremities: Normal range of motion, No clubbing, cyanosis or edema  Vascular: Peripheral pulses palpable 2+ bilaterally    TELEMETRY: 	    ECG:  	  RADIOLOGY:   < from: CT Head No Cont (11.01.19 @ 16:32) >    EXAM:  CT BRAIN                            PROCEDURE DATE:  11/01/2019            INTERPRETATION:  Noncontrast CT of the brain.    CLINICAL INDICATION:  Head trauma, headache    TECHNIQUE : Axial CT scanning of the brain was obtained from the skull  base to the vertex without the administration of intravenous contrast.      COMPARISON: None available    FINDINGS:      There is no hydrocephalus, mass effect, vasogenic edema, midline shift,   or intracranial hemorrhage.  There is no CT evidence of acute territorial   infarct. Moderate white matter microvascular ischemic disease is present.    There is no displaced calvarial fracture. Left ethmoid and bilateral   maxillary sinus mucosal thickening. Mastoid air cells clear.    IMPRESSION:    No acute intracranial hemorrhage or mass effect. No displaced calvarial   fracture.                          ESDRAS ELISE M.D., ATTENDING RADIOLOGIST  This document has been electronically signed. Nov 1 2019  4:23PM                < end of copied text >       < from: CT Abdomen and Pelvis w/ IV Cont (11.01.19 @ 16:36) >    EXAM:  CT ABDOMEN AND PELVIS IC                          EXAM:  CT CHEST IC                            PROCEDURE DATE:  11/01/2019            INTERPRETATION:  CLINICAL INFORMATION: Blunt abdominal trauma with right   rib pain status post mechanicalfall 5 days ago.    COMPARISON: Prior abdominal CT dated 2/14/2013.    PROCEDURE:   CT of the Chest, Abdomen and Pelvis was performed with intravenous   contrast.   Imaging was performed through the chest in the arterial phase followed by   imaging of the abdomen and pelvis in the portal venous phase.  Intravenous contrast: 90 ml Omnipaque 350. 10 ml discarded.  Oral contrast: None.  Sagittal and coronal reformats were performed.    FINDINGS:    CHEST:     LUNGS AND LARGE AIRWAYS: Patent central airways. Subpleural 0.4 cm right   upper lobe nodule (2:20). Patchy groundglass opacities laterally.   Calcified granuloma in the right upper lobe.  PLEURA: Small bilateral pleural effusions, right greater than left.  VESSELS: Atherosclerotic calcification of the aorta and coronary arteries.  HEART: Heart size is normal. No pericardial effusion.  MEDIASTINUM AND BHANU: Mediastinal lymph nodes. For example, a right   paratracheal lymph node measures 1.6 x 1.6 cm (2:26).  CHEST WALL AND LOWER NECK: Status post sternotomy.    ABDOMEN AND PELVIS:    LIVER: Within normal limits. A 0.9 cm soft tissue density at the inferior   aspect of the liver (3:42) is unchanged since 2/14/2013.  BILE DUCTS: Normal caliber.  GALLBLADDER: Status post cholecystectomy.  SPLEEN: Within normal limits.  PANCREAS: Within normal limits.  ADRENALS: Within normal limits.  KIDNEYS/URETERS: No hydronephrosis. A subcentimeter hypodense focus in   the lower pole of the right kidney is too small to characterize.    BLADDER: Within normal limits.  REPRODUCTIVE ORGANS: Prostate gland is mildly enlarged.    BOWEL: No bowel obstruction. Sigmoid diverticulosis.  PERITONEUM: No ascites.  VESSELS: Atherosclerotic calcification.  RETROPERITONEUM/LYMPH NODES: No lymphadenopathy.  ABDOMINAL WALL: Small fat-containing umbilical hernia.  BONES: Mildly displaced fractures of the right eighth and ninth lateral   ribs. Old left rib fractures. Degenerative changes in the spine.    IMPRESSION:     Mildly displaced fractures of the right eighth and ninth lateral ribs.    Small bilateral pleural effusions, right greater than left.    No solid visceral injury.    Indeterminate subpleural 0.4 cm right upper lobe nodule. Follow-up chest   CT is recommended in 3 months to assess for stability.    Nonspecific mediastinal lymphadenopathy of uncertain etiology.                        MARIO CALDERÓN M.D., ATTENDING RADIOLOGIST  This document has been electronically signed. Nov 1 2019  4:45PM                < end of copied text >    OTHER: 	  	  LABS:	 	    CARDIAC MARKERS:                                  15.6   10.71 )-----------( 159      ( 01 Nov 2019 14:53 )             46.4     11-01    137  |  105  |  18  ----------------------------<  85  4.6   |  23  |  0.84    Ca    8.5      01 Nov 2019 14:53    TPro  6.9  /  Alb  3.5  /  TBili  0.6  /  DBili  x   /  AST  24  /  ALT  12  /  AlkPhos  72  11-01    proBNP:   Lipid Profile:   HgA1c:   TSH: CHIEF COMPLAINT: Syncope     HISTORY OF PRESENT ILLNESS:  79 yo male with hx of HTN, HLD, CAD with history of open heart surgery on anticoagulation and history of open cholecystectomy who presents sp mechanical fall 5 days ago. Patient states he was in the shower and hit his ribs on the ledge getting out of the shower. Did not hit his head or lose consciousness. Originally, felt well but over the past few days has had severe, stabbing pain in the right side of chest. Went to [facility] center today which showed 3 rib fractures. Was given 5 mg of morphine via EMS on way here today with improvement. Denies headache, nausea, vomiting.      PAST MEDICAL & SURGICAL HISTORY:  Hyperlipidemia  Hypertension  Diabetes mellitus  Hx of smoking  Obesity (BMI 30-39.9)  DVT (deep venous thrombosis): right leg 8 yrs ago  Cholecystitis chronic, acute  HLD (hyperlipidemia)  DM (diabetes mellitus)  Anxiety  Depression  HTN (hypertension)  PVD (peripheral vascular disease)  CAD (coronary artery disease)  No significant past surgical history  S/P appendectomy: 1952  S/P CABG (coronary artery bypass graft)          MEDICATIONS:  clopidogrel Tablet 75 milliGRAM(s) Oral daily  losartan 50 milliGRAM(s) Oral daily  metoprolol succinate ER 50 milliGRAM(s) Oral daily  rivaroxaban 20 milliGRAM(s) Oral with dinner      albuterol/ipratropium for Nebulization. 3 milliLiter(s) Nebulizer once    acetaminophen   Tablet .. 650 milliGRAM(s) Oral every 6 hours PRN  diazepam    Tablet 5 milliGRAM(s) Oral daily PRN  morphine  - Injectable 4 milliGRAM(s) IV Push every 6 hours PRN  oxyCODONE    IR 10 milliGRAM(s) Oral every 6 hours PRN  oxyCODONE  ER Tablet 15 milliGRAM(s) Oral every 12 hours  sertraline 100 milliGRAM(s) Oral daily    polyethylene glycol 3350 17 Gram(s) Oral daily PRN    atorvastatin 20 milliGRAM(s) Oral at bedtime  dextrose 40% Gel 15 Gram(s) Oral once PRN  dextrose 50% Injectable 12.5 Gram(s) IV Push once  dextrose 50% Injectable 25 Gram(s) IV Push once  dextrose 50% Injectable 25 Gram(s) IV Push once  glucagon  Injectable 1 milliGRAM(s) IntraMuscular once PRN  insulin glargine Injectable (LANTUS) 80 Unit(s) SubCutaneous at bedtime  insulin lispro (HumaLOG) corrective regimen sliding scale   SubCutaneous three times a day before meals  insulin lispro (HumaLOG) corrective regimen sliding scale   SubCutaneous at bedtime    dextrose 5%. 1000 milliLiter(s) IV Continuous <Continuous>  influenza   Vaccine 0.5 milliLiter(s) IntraMuscular once      FAMILY HISTORY:  No pertinent family history in first degree relatives      SOCIAL HISTORY:    [ ] Non-smoker  [ ] Smoker  [ ] Alcohol    Allergies    dust (Rhinorrhea)  No Known Drug Allergies    Intolerances    ramipril (Other)  	    REVIEW OF SYSTEMS:  CONSTITUTIONAL: No fever, weight loss, + fatigue  EYES: No eye pain, visual disturbances, or discharge  ENMT:  No difficulty hearing, tinnitus, vertigo; No sinus or throat pain  NECK: No pain or stiffness  RESPIRATORY: No cough, wheezing, chills or hemoptysis; No Shortness of Breath  CARDIOVASCULAR: No chest pain, palpitations, passing out, dizziness, or leg swelling  GASTROINTESTINAL: No abdominal or epigastric pain. No nausea, vomiting, or hematemesis; No diarrhea or constipation. No melena or hematochezia.  GENITOURINARY: No dysuria, frequency, hematuria, or incontinence  NEUROLOGICAL: No headaches, memory loss, loss of strength, numbness, or tremors  SKIN: No itching, burning, rashes, or lesions   LYMPH Nodes: No enlarged glands  ENDOCRINE: No heat or cold intolerance; No hair loss  MUSCULOSKELETAL: + joint pain or swelling; No muscle, back, or extremity pain  PSYCHIATRIC: No depression, anxiety, mood swings, or difficulty sleeping  HEME/LYMPH: No easy bruising, or bleeding gums  ALLERY AND IMMUNOLOGIC: No hives or eczema	    [ ] All others negative	  [ ] Unable to obtain    PHYSICAL EXAM:  T(C): 37 (11-02-19 @ 12:10), Max: 37 (11-02-19 @ 12:10)  HR: 45 (11-02-19 @ 12:10) (45 - 78)  BP: 154/73 (11-02-19 @ 12:10) (154/73 - 176/76)  RR: 19 (11-02-19 @ 12:10) (18 - 19)  SpO2: 91% (11-02-19 @ 12:10) (91% - 94%)  Wt(kg): --  I&O's Summary    01 Nov 2019 07:01  -  02 Nov 2019 07:00  --------------------------------------------------------  IN: 180 mL / OUT: 300 mL / NET: -120 mL    02 Nov 2019 08:01  -  02 Nov 2019 20:58  --------------------------------------------------------  IN: 240 mL / OUT: 0 mL / NET: 240 mL        Appearance: Normal	  HEENT:   Normal oral mucosa, PERRL, EOMI	  Lymphatic: No lymphadenopathy  Cardiovascular: Normal S1 S2, No JVD, No murmurs, No edema  Respiratory: Lungs clear to auscultation	  Psychiatry: A & O x 3, Mood & affect appropriate  Gastrointestinal:  Soft, Non-tender, + BS	  Skin: No rashes, No ecchymoses, No cyanosis	  Neurologic: Non-focal  Extremities: Normal range of motion, No clubbing, cyanosis or edema  Vascular: Peripheral pulses palpable 2+ bilaterally    TELEMETRY: SR	    ECG:  	NSR< no acute ischemic stt changes   RADIOLOGY:   < from: CT Head No Cont (11.01.19 @ 16:32) >    EXAM:  CT BRAIN                            PROCEDURE DATE:  11/01/2019            INTERPRETATION:  Noncontrast CT of the brain.    CLINICAL INDICATION:  Head trauma, headache    TECHNIQUE : Axial CT scanning of the brain was obtained from the skull  base to the vertex without the administration of intravenous contrast.      COMPARISON: None available    FINDINGS:      There is no hydrocephalus, mass effect, vasogenic edema, midline shift,   or intracranial hemorrhage.  There is no CT evidence of acute territorial   infarct. Moderate white matter microvascular ischemic disease is present.    There is no displaced calvarial fracture. Left ethmoid and bilateral   maxillary sinus mucosal thickening. Mastoid air cells clear.    IMPRESSION:    No acute intracranial hemorrhage or mass effect. No displaced calvarial   fracture.      ESDRAS ELISE M.D., ATTENDING RADIOLOGIST  This document has been electronically signed. Nov 1 2019  4:23PM                < end of copied text >       < from: CT Abdomen and Pelvis w/ IV Cont (11.01.19 @ 16:36) >    EXAM:  CT ABDOMEN AND PELVIS IC                          EXAM:  CT CHEST IC                            PROCEDURE DATE:  11/01/2019            INTERPRETATION:  CLINICAL INFORMATION: Blunt abdominal trauma with right   rib pain status post mechanicalfall 5 days ago.    COMPARISON: Prior abdominal CT dated 2/14/2013.    PROCEDURE:   CT of the Chest, Abdomen and Pelvis was performed with intravenous   contrast.   Imaging was performed through the chest in the arterial phase followed by   imaging of the abdomen and pelvis in the portal venous phase.  Intravenous contrast: 90 ml Omnipaque 350. 10 ml discarded.  Oral contrast: None.  Sagittal and coronal reformats were performed.    FINDINGS:    CHEST:     LUNGS AND LARGE AIRWAYS: Patent central airways. Subpleural 0.4 cm right   upper lobe nodule (2:20). Patchy groundglass opacities laterally.   Calcified granuloma in the right upper lobe.  PLEURA: Small bilateral pleural effusions, right greater than left.  VESSELS: Atherosclerotic calcification of the aorta and coronary arteries.  HEART: Heart size is normal. No pericardial effusion.  MEDIASTINUM AND BHANU: Mediastinal lymph nodes. For example, a right   paratracheal lymph node measures 1.6 x 1.6 cm (2:26).  CHEST WALL AND LOWER NECK: Status post sternotomy.    ABDOMEN AND PELVIS:    LIVER: Within normal limits. A 0.9 cm soft tissue density at the inferior   aspect of the liver (3:42) is unchanged since 2/14/2013.  BILE DUCTS: Normal caliber.  GALLBLADDER: Status post cholecystectomy.  SPLEEN: Within normal limits.  PANCREAS: Within normal limits.  ADRENALS: Within normal limits.  KIDNEYS/URETERS: No hydronephrosis. A subcentimeter hypodense focus in   the lower pole of the right kidney is too small to characterize.    BLADDER: Within normal limits.  REPRODUCTIVE ORGANS: Prostate gland is mildly enlarged.    BOWEL: No bowel obstruction. Sigmoid diverticulosis.  PERITONEUM: No ascites.  VESSELS: Atherosclerotic calcification.  RETROPERITONEUM/LYMPH NODES: No lymphadenopathy.  ABDOMINAL WALL: Small fat-containing umbilical hernia.  BONES: Mildly displaced fractures of the right eighth and ninth lateral   ribs. Old left rib fractures. Degenerative changes in the spine.    IMPRESSION:     Mildly displaced fractures of the right eighth and ninth lateral ribs.    Small bilateral pleural effusions, right greater than left.    No solid visceral injury.    Indeterminate subpleural 0.4 cm right upper lobe nodule. Follow-up chest   CT is recommended in 3 months to assess for stability.    Nonspecific mediastinal lymphadenopathy of uncertain etiology.                        MARIO CALDERÓN M.D., ATTENDING RADIOLOGIST  This document has been electronically signed. Nov 1 2019  4:45PM                < end of copied text >    OTHER: 	  	  LABS:	 	    CARDIAC MARKERS:                                  15.6   10.71 )-----------( 159      ( 01 Nov 2019 14:53 )             46.4     11-01    137  |  105  |  18  ----------------------------<  85  4.6   |  23  |  0.84    Ca    8.5      01 Nov 2019 14:53    TPro  6.9  /  Alb  3.5  /  TBili  0.6  /  DBili  x   /  AST  24  /  ALT  12  /  AlkPhos  72  11-01    proBNP:   Lipid Profile:   HgA1c:   TSH: CHIEF COMPLAINT: Syncope     HISTORY OF PRESENT ILLNESS:  77 yo male with hx of HTN, HLD, CAD with history of open heart surgery on anticoagulation and history of open cholecystectomy who presents sp mechanical fall 5 days ago. Patient states he was in the shower and hit his ribs on the ledge getting out of the shower. Did not hit his head or lose consciousness. Originally, felt well but over the past few days has had severe, stabbing pain in the right side of chest. Went to [facility] center today which showed 3 rib fractures. Was given 5 mg of morphine via EMS on way here today with improvement. Denies headache, nausea, vomiting.      PAST MEDICAL & SURGICAL HISTORY:  Hyperlipidemia  Hypertension  Diabetes mellitus  Hx of smoking  Obesity (BMI 30-39.9)  DVT (deep venous thrombosis): right leg 8 yrs ago  Cholecystitis chronic, acute  HLD (hyperlipidemia)  DM (diabetes mellitus)  Anxiety  Depression  HTN (hypertension)  PVD (peripheral vascular disease)  CAD (coronary artery disease)  No significant past surgical history  S/P appendectomy: 1952  S/P CABG (coronary artery bypass graft)          MEDICATIONS:  clopidogrel Tablet 75 milliGRAM(s) Oral daily  losartan 50 milliGRAM(s) Oral daily  metoprolol succinate ER 50 milliGRAM(s) Oral daily  rivaroxaban 20 milliGRAM(s) Oral with dinner      albuterol/ipratropium for Nebulization. 3 milliLiter(s) Nebulizer once    acetaminophen   Tablet .. 650 milliGRAM(s) Oral every 6 hours PRN  diazepam    Tablet 5 milliGRAM(s) Oral daily PRN  morphine  - Injectable 4 milliGRAM(s) IV Push every 6 hours PRN  oxyCODONE    IR 10 milliGRAM(s) Oral every 6 hours PRN  oxyCODONE  ER Tablet 15 milliGRAM(s) Oral every 12 hours  sertraline 100 milliGRAM(s) Oral daily    polyethylene glycol 3350 17 Gram(s) Oral daily PRN    atorvastatin 20 milliGRAM(s) Oral at bedtime  dextrose 40% Gel 15 Gram(s) Oral once PRN  dextrose 50% Injectable 12.5 Gram(s) IV Push once  dextrose 50% Injectable 25 Gram(s) IV Push once  dextrose 50% Injectable 25 Gram(s) IV Push once  glucagon  Injectable 1 milliGRAM(s) IntraMuscular once PRN  insulin glargine Injectable (LANTUS) 80 Unit(s) SubCutaneous at bedtime  insulin lispro (HumaLOG) corrective regimen sliding scale   SubCutaneous three times a day before meals  insulin lispro (HumaLOG) corrective regimen sliding scale   SubCutaneous at bedtime    dextrose 5%. 1000 milliLiter(s) IV Continuous <Continuous>  influenza   Vaccine 0.5 milliLiter(s) IntraMuscular once      FAMILY HISTORY:  No pertinent family history in first degree relatives      SOCIAL HISTORY:    [ ] Non-smoker  [ ] Smoker  [ ] Alcohol    Allergies    dust (Rhinorrhea)  No Known Drug Allergies    Intolerances    ramipril (Other)  	    REVIEW OF SYSTEMS:  CONSTITUTIONAL: No fever, weight loss, + fatigue  EYES: No eye pain, visual disturbances, or discharge  ENMT:  No difficulty hearing, tinnitus, vertigo; No sinus or throat pain  NECK: No pain or stiffness  RESPIRATORY: No cough, wheezing, chills or hemoptysis; No Shortness of Breath  CARDIOVASCULAR: No chest pain, palpitations, passing out, dizziness, or leg swelling  GASTROINTESTINAL: No abdominal or epigastric pain. No nausea, vomiting, or hematemesis; No diarrhea or constipation. No melena or hematochezia.  GENITOURINARY: No dysuria, frequency, hematuria, or incontinence  NEUROLOGICAL: No headaches, memory loss, loss of strength, numbness, or tremors  SKIN: No itching, burning, rashes, or lesions   LYMPH Nodes: No enlarged glands  ENDOCRINE: No heat or cold intolerance; No hair loss  MUSCULOSKELETAL: + joint pain or swelling; No muscle, back, or extremity pain  PSYCHIATRIC: No depression, anxiety, mood swings, or difficulty sleeping  HEME/LYMPH: No easy bruising, or bleeding gums  ALLERY AND IMMUNOLOGIC: No hives or eczema	    [ ] All others negative	  [ ] Unable to obtain    PHYSICAL EXAM:  T(C): 37 (11-02-19 @ 12:10), Max: 37 (11-02-19 @ 12:10)  HR: 45 (11-02-19 @ 12:10) (45 - 78)  BP: 154/73 (11-02-19 @ 12:10) (154/73 - 176/76)  RR: 19 (11-02-19 @ 12:10) (18 - 19)  SpO2: 91% (11-02-19 @ 12:10) (91% - 94%)  Wt(kg): --  I&O's Summary    01 Nov 2019 07:01  -  02 Nov 2019 07:00  --------------------------------------------------------  IN: 180 mL / OUT: 300 mL / NET: -120 mL    02 Nov 2019 08:01  -  02 Nov 2019 20:58  --------------------------------------------------------  IN: 240 mL / OUT: 0 mL / NET: 240 mL        Appearance: Normal	  HEENT:   Normal oral mucosa, PERRL, EOMI	  Lymphatic: No lymphadenopathy  Cardiovascular: Irregular S1 S2, No JVD, No murmurs, No edema  Respiratory: + bronchospasm 	  Psychiatry: A & O x 3, Mood & affect appropriate  Gastrointestinal:  Soft, Non-tender, + BS	  Skin: No rashes, No ecchymoses, No cyanosis	  Neurologic: Non-focal  Extremities: Normal range of motion, No clubbing, cyanosis or edema  Vascular: Peripheral pulses palpable 2+ bilaterally    TELEMETRY: Aflutter   ECG:    RADIOLOGY:   < from: CT Head No Cont (11.01.19 @ 16:32) >    EXAM:  CT BRAIN                            PROCEDURE DATE:  11/01/2019            INTERPRETATION:  Noncontrast CT of the brain.    CLINICAL INDICATION:  Head trauma, headache    TECHNIQUE : Axial CT scanning of the brain was obtained from the skull  base to the vertex without the administration of intravenous contrast.      COMPARISON: None available    FINDINGS:      There is no hydrocephalus, mass effect, vasogenic edema, midline shift,   or intracranial hemorrhage.  There is no CT evidence of acute territorial   infarct. Moderate white matter microvascular ischemic disease is present.    There is no displaced calvarial fracture. Left ethmoid and bilateral   maxillary sinus mucosal thickening. Mastoid air cells clear.    IMPRESSION:    No acute intracranial hemorrhage or mass effect. No displaced calvarial   fracture.      ESDRAS ELISE M.D., ATTENDING RADIOLOGIST  This document has been electronically signed. Nov 1 2019  4:23PM                < end of copied text >       < from: CT Abdomen and Pelvis w/ IV Cont (11.01.19 @ 16:36) >    EXAM:  CT ABDOMEN AND PELVIS IC                          EXAM:  CT CHEST IC                            PROCEDURE DATE:  11/01/2019            INTERPRETATION:  CLINICAL INFORMATION: Blunt abdominal trauma with right   rib pain status post mechanicalfall 5 days ago.    COMPARISON: Prior abdominal CT dated 2/14/2013.    PROCEDURE:   CT of the Chest, Abdomen and Pelvis was performed with intravenous   contrast.   Imaging was performed through the chest in the arterial phase followed by   imaging of the abdomen and pelvis in the portal venous phase.  Intravenous contrast: 90 ml Omnipaque 350. 10 ml discarded.  Oral contrast: None.  Sagittal and coronal reformats were performed.    FINDINGS:    CHEST:     LUNGS AND LARGE AIRWAYS: Patent central airways. Subpleural 0.4 cm right   upper lobe nodule (2:20). Patchy groundglass opacities laterally.   Calcified granuloma in the right upper lobe.  PLEURA: Small bilateral pleural effusions, right greater than left.  VESSELS: Atherosclerotic calcification of the aorta and coronary arteries.  HEART: Heart size is normal. No pericardial effusion.  MEDIASTINUM AND BHANU: Mediastinal lymph nodes. For example, a right   paratracheal lymph node measures 1.6 x 1.6 cm (2:26).  CHEST WALL AND LOWER NECK: Status post sternotomy.    ABDOMEN AND PELVIS:    LIVER: Within normal limits. A 0.9 cm soft tissue density at the inferior   aspect of the liver (3:42) is unchanged since 2/14/2013.  BILE DUCTS: Normal caliber.  GALLBLADDER: Status post cholecystectomy.  SPLEEN: Within normal limits.  PANCREAS: Within normal limits.  ADRENALS: Within normal limits.  KIDNEYS/URETERS: No hydronephrosis. A subcentimeter hypodense focus in   the lower pole of the right kidney is too small to characterize.    BLADDER: Within normal limits.  REPRODUCTIVE ORGANS: Prostate gland is mildly enlarged.    BOWEL: No bowel obstruction. Sigmoid diverticulosis.  PERITONEUM: No ascites.  VESSELS: Atherosclerotic calcification.  RETROPERITONEUM/LYMPH NODES: No lymphadenopathy.  ABDOMINAL WALL: Small fat-containing umbilical hernia.  BONES: Mildly displaced fractures of the right eighth and ninth lateral   ribs. Old left rib fractures. Degenerative changes in the spine.    IMPRESSION:     Mildly displaced fractures of the right eighth and ninth lateral ribs.    Small bilateral pleural effusions, right greater than left.    No solid visceral injury.    Indeterminate subpleural 0.4 cm right upper lobe nodule. Follow-up chest   CT is recommended in 3 months to assess for stability.    Nonspecific mediastinal lymphadenopathy of uncertain etiology.                        MARIO CALDERÓN M.D., ATTENDING RADIOLOGIST  This document has been electronically signed. Nov 1 2019  4:45PM                < end of copied text >    OTHER: 	  	  LABS:	 	    CARDIAC MARKERS:                                  15.6   10.71 )-----------( 159      ( 01 Nov 2019 14:53 )             46.4     11-01    137  |  105  |  18  ----------------------------<  85  4.6   |  23  |  0.84    Ca    8.5      01 Nov 2019 14:53    TPro  6.9  /  Alb  3.5  /  TBili  0.6  /  DBili  x   /  AST  24  /  ALT  12  /  AlkPhos  72  11-01    proBNP:   Lipid Profile:   HgA1c:   TSH:

## 2019-11-02 NOTE — PROGRESS NOTE ADULT - SUBJECTIVE AND OBJECTIVE BOX
Patient is a 78y old  Male who presents with a chief complaint of syncope (2019 20:57)      SUBJECTIVE / OVERNIGHT EVENTS: post fall RIB Fx, pain controlled w Oxy ER 15 bid, hasnt needed breakthrough OXY IR for the past 12 hrs.    MEDICATIONS  (STANDING):  albuterol/ipratropium for Nebulization. 3 milliLiter(s) Nebulizer once  atorvastatin 20 milliGRAM(s) Oral at bedtime  clopidogrel Tablet 75 milliGRAM(s) Oral daily  dextrose 5%. 1000 milliLiter(s) (50 mL/Hr) IV Continuous <Continuous>  dextrose 50% Injectable 12.5 Gram(s) IV Push once  dextrose 50% Injectable 25 Gram(s) IV Push once  dextrose 50% Injectable 25 Gram(s) IV Push once  influenza   Vaccine 0.5 milliLiter(s) IntraMuscular once  insulin glargine Injectable (LANTUS) 80 Unit(s) SubCutaneous at bedtime  insulin lispro (HumaLOG) corrective regimen sliding scale   SubCutaneous three times a day before meals  insulin lispro (HumaLOG) corrective regimen sliding scale   SubCutaneous at bedtime  losartan 50 milliGRAM(s) Oral daily  metoprolol succinate ER 50 milliGRAM(s) Oral daily  oxyCODONE  ER Tablet 15 milliGRAM(s) Oral every 12 hours  rivaroxaban 20 milliGRAM(s) Oral with dinner  sertraline 100 milliGRAM(s) Oral daily    MEDICATIONS  (PRN):  acetaminophen   Tablet .. 650 milliGRAM(s) Oral every 6 hours PRN Temp greater or equal to 38C (100.4F), Mild Pain (1 - 3)  dextrose 40% Gel 15 Gram(s) Oral once PRN Blood Glucose LESS THAN 70 milliGRAM(s)/deciliter  diazepam    Tablet 5 milliGRAM(s) Oral daily PRN anxiety  glucagon  Injectable 1 milliGRAM(s) IntraMuscular once PRN Glucose LESS THAN 70 milligrams/deciliter  morphine  - Injectable 4 milliGRAM(s) IV Push every 6 hours PRN Severe Pain (7 - 10)  oxyCODONE    IR 10 milliGRAM(s) Oral every 6 hours PRN Moderate Pain (4 - 6)  polyethylene glycol 3350 17 Gram(s) Oral daily PRN Constipation      Vital Signs Last 24 Hrs  T(F): 98 (19 @ 21:08), Max: 98.6 (11-02-19 @ 12:10)  HR: 72 (19 @ 21:08) (45 - 72)  BP: 175/95 (19 @ 21:08) (154/73 - 175/95)  RR: 18 (19 @ 21:08) (18 - 19)  SpO2: 92% (19 @ 21:08) (91% - 92%)  Telemetry:   CAPILLARY BLOOD GLUCOSE      POCT Blood Glucose.: 135 mg/dL (2019 21:23)  POCT Blood Glucose.: 157 mg/dL (2019 17:22)  POCT Blood Glucose.: 95 mg/dL (2019 12:48)  POCT Blood Glucose.: 83 mg/dL (2019 08:48)  POCT Blood Glucose.: 129 mg/dL (2019 02:13)    I&O's Summary    2019 07:01  -  2019 07:00  --------------------------------------------------------  IN: 180 mL / OUT: 300 mL / NET: -120 mL    2019 08:01  -  2019 22:44  --------------------------------------------------------  IN: 600 mL / OUT: 0 mL / NET: 600 mL        PHYSICAL EXAM:  GENERAL: NAD, well-developed  HEAD:  Atraumatic, Normocephalic  EYES: EOMI, PERRLA, conjunctiva and sclera clear  NECK: Supple, No JVD  CHEST/LUNG: Clear to auscultation bilaterally; No wheeze  HEART: Regular rate and rhythm; No murmurs, rubs, or gallops  ABDOMEN: Soft, Nontender, Nondistended; Bowel sounds present  EXTREMITIES:  2+ Peripheral Pulses, No clubbing, cyanosis, or edema  PSYCH: AAOx3  NEUROLOGY: non-focal  SKIN: No rashes or lesions    LABS:                        15.6   10.71 )-----------( 159      ( 2019 14:53 )             46.4         137  |  105  |  18  ----------------------------<  85  4.6   |  23  |  0.84    Ca    8.5      2019 14:53    TPro  6.9  /  Alb  3.5  /  TBili  0.6  /  DBili  x   /  AST  24  /  ALT  12  /  AlkPhos  72      PT/INR - ( 2019 14:53 )   PT: 22.7 sec;   INR: 1.95 ratio         PTT - ( 2019 14:53 )  PTT:39.0 sec  CARDIAC MARKERS ( 2019 23:48 )  x     / x     / 77 U/L / x     / x          Urinalysis Basic - ( 2019 14:53 )    Color: Light Yellow / Appearance: Clear / S.013 / pH: x  Gluc: x / Ketone: Negative  / Bili: Negative / Urobili: Negative   Blood: x / Protein: 100 mg/dL / Nitrite: Negative   Leuk Esterase: Negative / RBC: 16 /hpf / WBC 1 /HPF   Sq Epi: x / Non Sq Epi: 0 /hpf / Bacteria: Negative        RADIOLOGY & ADDITIONAL TESTS:    Imaging Personally Reviewed:    Consultant(s) Notes Reviewed:      Care Discussed with Consultants/Other Providers:

## 2019-11-03 DIAGNOSIS — E11.9 TYPE 2 DIABETES MELLITUS WITHOUT COMPLICATIONS: ICD-10-CM

## 2019-11-03 DIAGNOSIS — S22.41XA MULTIPLE FRACTURES OF RIBS, RIGHT SIDE, INITIAL ENCOUNTER FOR CLOSED FRACTURE: ICD-10-CM

## 2019-11-03 DIAGNOSIS — I25.10 ATHEROSCLEROTIC HEART DISEASE OF NATIVE CORONARY ARTERY WITHOUT ANGINA PECTORIS: ICD-10-CM
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
## 2019-11-03 DIAGNOSIS — I48.92 UNSPECIFIED ATRIAL FLUTTER: ICD-10-CM

## 2019-11-03 DIAGNOSIS — J98.01 ACUTE BRONCHOSPASM: ICD-10-CM

## 2019-11-03 LAB
ANION GAP SERPL CALC-SCNC: 11 MMOL/L — SIGNIFICANT CHANGE UP (ref 5–17)
BUN SERPL-MCNC: 24 MG/DL — HIGH (ref 7–23)
CALCIUM SERPL-MCNC: 8.1 MG/DL — LOW (ref 8.4–10.5)
CHLORIDE SERPL-SCNC: 101 MMOL/L — SIGNIFICANT CHANGE UP (ref 96–108)
CO2 SERPL-SCNC: 27 MMOL/L — SIGNIFICANT CHANGE UP (ref 22–31)
CREAT SERPL-MCNC: 1.2 MG/DL — SIGNIFICANT CHANGE UP (ref 0.5–1.3)
GLUCOSE BLDC GLUCOMTR-MCNC: 126 MG/DL — HIGH (ref 70–99)
GLUCOSE BLDC GLUCOMTR-MCNC: 147 MG/DL — HIGH (ref 70–99)
GLUCOSE BLDC GLUCOMTR-MCNC: 161 MG/DL — HIGH (ref 70–99)
GLUCOSE BLDC GLUCOMTR-MCNC: 163 MG/DL — HIGH (ref 70–99)
GLUCOSE BLDC GLUCOMTR-MCNC: 60 MG/DL — LOW (ref 70–99)
GLUCOSE BLDC GLUCOMTR-MCNC: 63 MG/DL — LOW (ref 70–99)
GLUCOSE BLDC GLUCOMTR-MCNC: 68 MG/DL — LOW (ref 70–99)
GLUCOSE SERPL-MCNC: 42 MG/DL — CRITICAL LOW (ref 70–99)
HBA1C BLD-MCNC: 10.2 % — HIGH (ref 4–5.6)
HCT VFR BLD CALC: 45.9 % — SIGNIFICANT CHANGE UP (ref 39–50)
HGB BLD-MCNC: 14.6 G/DL — SIGNIFICANT CHANGE UP (ref 13–17)
MCHC RBC-ENTMCNC: 29 PG — SIGNIFICANT CHANGE UP (ref 27–34)
MCHC RBC-ENTMCNC: 31.8 GM/DL — LOW (ref 32–36)
MCV RBC AUTO: 91.1 FL — SIGNIFICANT CHANGE UP (ref 80–100)
NRBC # BLD: 0 /100 WBCS — SIGNIFICANT CHANGE UP (ref 0–0)
PLATELET # BLD AUTO: 156 K/UL — SIGNIFICANT CHANGE UP (ref 150–400)
POTASSIUM SERPL-MCNC: 4 MMOL/L — SIGNIFICANT CHANGE UP (ref 3.5–5.3)
POTASSIUM SERPL-SCNC: 4 MMOL/L — SIGNIFICANT CHANGE UP (ref 3.5–5.3)
RBC # BLD: 5.04 M/UL — SIGNIFICANT CHANGE UP (ref 4.2–5.8)
RBC # FLD: 13.8 % — SIGNIFICANT CHANGE UP (ref 10.3–14.5)
SODIUM SERPL-SCNC: 139 MMOL/L — SIGNIFICANT CHANGE UP (ref 135–145)
WBC # BLD: 8.19 K/UL — SIGNIFICANT CHANGE UP (ref 3.8–10.5)
WBC # FLD AUTO: 8.19 K/UL — SIGNIFICANT CHANGE UP (ref 3.8–10.5)

## 2019-11-03 RX ORDER — ACETAMINOPHEN 500 MG
650 TABLET ORAL EVERY 6 HOURS
Refills: 0 | Status: DISCONTINUED | OUTPATIENT
Start: 2019-11-03 | End: 2019-11-06

## 2019-11-03 RX ORDER — OXYCODONE HYDROCHLORIDE 5 MG/1
10 TABLET ORAL EVERY 12 HOURS
Refills: 0 | Status: DISCONTINUED | OUTPATIENT
Start: 2019-11-03 | End: 2019-11-04

## 2019-11-03 RX ORDER — DEXTROSE 50 % IN WATER 50 %
12.5 SYRINGE (ML) INTRAVENOUS ONCE
Refills: 0 | Status: COMPLETED | OUTPATIENT
Start: 2019-11-03 | End: 2019-11-03

## 2019-11-03 RX ADMIN — LOSARTAN POTASSIUM 50 MILLIGRAM(S): 100 TABLET, FILM COATED ORAL at 06:02

## 2019-11-03 RX ADMIN — SERTRALINE 100 MILLIGRAM(S): 25 TABLET, FILM COATED ORAL at 13:12

## 2019-11-03 RX ADMIN — ATORVASTATIN CALCIUM 20 MILLIGRAM(S): 80 TABLET, FILM COATED ORAL at 21:39

## 2019-11-03 RX ADMIN — Medication 12.5 GRAM(S): at 08:35

## 2019-11-03 RX ADMIN — Medication 650 MILLIGRAM(S): at 22:09

## 2019-11-03 RX ADMIN — OXYCODONE HYDROCHLORIDE 10 MILLIGRAM(S): 5 TABLET ORAL at 18:30

## 2019-11-03 RX ADMIN — OXYCODONE HYDROCHLORIDE 10 MILLIGRAM(S): 5 TABLET ORAL at 17:22

## 2019-11-03 RX ADMIN — INSULIN GLARGINE 80 UNIT(S): 100 INJECTION, SOLUTION SUBCUTANEOUS at 22:02

## 2019-11-03 RX ADMIN — Medication 50 MILLIGRAM(S): at 06:02

## 2019-11-03 RX ADMIN — OXYCODONE HYDROCHLORIDE 10 MILLIGRAM(S): 5 TABLET ORAL at 18:00

## 2019-11-03 RX ADMIN — CLOPIDOGREL BISULFATE 75 MILLIGRAM(S): 75 TABLET, FILM COATED ORAL at 13:12

## 2019-11-03 RX ADMIN — RIVAROXABAN 20 MILLIGRAM(S): KIT at 17:22

## 2019-11-03 RX ADMIN — OXYCODONE HYDROCHLORIDE 15 MILLIGRAM(S): 5 TABLET ORAL at 06:32

## 2019-11-03 RX ADMIN — OXYCODONE HYDROCHLORIDE 10 MILLIGRAM(S): 5 TABLET ORAL at 17:30

## 2019-11-03 RX ADMIN — Medication 650 MILLIGRAM(S): at 21:39

## 2019-11-03 RX ADMIN — OXYCODONE HYDROCHLORIDE 15 MILLIGRAM(S): 5 TABLET ORAL at 06:02

## 2019-11-03 NOTE — PHYSICAL THERAPY INITIAL EVALUATION ADULT - PERTINENT HX OF CURRENT PROBLEM, REHAB EVAL
77 yo male with hx of HTN, HLD, CAD with history of open heart surgery on anticoagulation and history of open cholecystectomy who presents sp mechanial fall 5 days ago. Patient states he was in the shower and hit his ribs on the ledge jutting out of the shower. Did not hit his head or lose consciousness. CT chest: Mildly displaced fractures of the right eighth and ninth lateral ribs. Small bilateral pleural effusions, right greater than left.

## 2019-11-03 NOTE — PROGRESS NOTE ADULT - ASSESSMENT
77 yo male s/p mechanical fall, admitted w intractable pain 2/2  2 right rib fx and nonspecific findings of a RUL lung nodule, which needs to be followed up on w rpt CT in 3 mo.  Pain controlled, cont  oxycodone ER but lower dose to 10 mg q12H from 15 mg, cont   prn Oxycodone IR for breakthrough pain.  ptn is calm, answers all questions appropriately, had an episode of confusion earlier, hasnt needed any PRN pain meds, daughter concerned her father will be addicted to Oxycodone. I explained to her his pain is acute post rib Fx and addiction at this point is not a concern. I expressed i would like him to be comfortable and pain free, she agreed and wants to stick to the plan, Will lower Oxycodone ER dose to 10 but if he will require breakthrough pain doses will raise back to 15 mg in am. daughter agrees,   ptn should be on aspiration precautions,   eating well,   PT eval done, recommend LISA  miralax prn constipation,   cont outptn meds  on chronic AC

## 2019-11-03 NOTE — PROGRESS NOTE ADULT - ASSESSMENT
79 yo male s/p  fall, admitted w intractable pain 2/2  2 right rib fx and nonspecific findings of a RUL lung nodule,

## 2019-11-03 NOTE — PHYSICAL THERAPY INITIAL EVALUATION ADULT - STRENGTHENING, PT EVAL
pt will demonstrate good strength in BLEs/BUEs to improve limb stability during ADls/mobility in 4weeks

## 2019-11-03 NOTE — PROGRESS NOTE ADULT - SUBJECTIVE AND OBJECTIVE BOX
Subjective: Patient seen and examined. No new events except as noted.   rib pain   leg pains       REVIEW OF SYSTEMS:    CONSTITUTIONAL: + weakness, fevers or chills  EYES/ENT: No visual changes;  No vertigo or throat pain   NECK: No pain or stiffness  RESPIRATORY: No cough, wheezing, hemoptysis; No shortness of breath  CARDIOVASCULAR: No chest pain or palpitations  GASTROINTESTINAL: No abdominal or epigastric pain. No nausea, vomiting, or hematemesis; No diarrhea or constipation. No melena or hematochezia.  GENITOURINARY: No dysuria, frequency or hematuria  NEUROLOGICAL: No numbness or weakness  SKIN: No itching, burning, rashes, or lesions   All other review of systems is negative unless indicated above.    MEDICATIONS:  MEDICATIONS  (STANDING):  albuterol/ipratropium for Nebulization. 3 milliLiter(s) Nebulizer once  atorvastatin 20 milliGRAM(s) Oral at bedtime  clopidogrel Tablet 75 milliGRAM(s) Oral daily  dextrose 5%. 1000 milliLiter(s) (50 mL/Hr) IV Continuous <Continuous>  dextrose 50% Injectable 12.5 Gram(s) IV Push once  dextrose 50% Injectable 25 Gram(s) IV Push once  dextrose 50% Injectable 25 Gram(s) IV Push once  influenza   Vaccine 0.5 milliLiter(s) IntraMuscular once  insulin glargine Injectable (LANTUS) 80 Unit(s) SubCutaneous at bedtime  insulin lispro (HumaLOG) corrective regimen sliding scale   SubCutaneous three times a day before meals  insulin lispro (HumaLOG) corrective regimen sliding scale   SubCutaneous at bedtime  losartan 50 milliGRAM(s) Oral daily  metoprolol succinate ER 50 milliGRAM(s) Oral daily  oxyCODONE  ER Tablet 15 milliGRAM(s) Oral every 12 hours  rivaroxaban 20 milliGRAM(s) Oral with dinner  sertraline 100 milliGRAM(s) Oral daily      PHYSICAL EXAM:  T(C): 36.7 (11-03-19 @ 04:30), Max: 37 (11-02-19 @ 12:10)  HR: 52 (11-03-19 @ 04:30) (45 - 73)  BP: 136/70 (11-03-19 @ 04:30) (136/70 - 175/95)  RR: 18 (11-03-19 @ 04:30) (18 - 19)  SpO2: 92% (11-03-19 @ 04:30) (91% - 93%)  Wt(kg): --  I&O's Summary    02 Nov 2019 08:01  -  03 Nov 2019 07:00  --------------------------------------------------------  IN: 840 mL / OUT: 0 mL / NET: 840 mL          Appearance: Normal	  HEENT:   Normal oral mucosa, PERRL, EOMI	  Lymphatic: No lymphadenopathy , no edema  Cardiovascular: Normal S1 S2, No JVD, No murmurs , Peripheral pulses palpable 2+ bilaterally  Respiratory: Lungs clear to auscultation, normal effort 	  Gastrointestinal:  Soft, Non-tender, + BS	  Skin: No rashes, No ecchymoses, No cyanosis, warm to touch  Musculoskeletal: Normal range of motion, normal strength  Psychiatry:  Mood & affect appropriate  Ext: No edema      LABS:    CARDIAC MARKERS:  CARDIAC MARKERS ( 01 Nov 2019 23:48 )  x     / x     / 77 U/L / x     / x                                    14.6   8.19  )-----------( 156      ( 03 Nov 2019 07:12 )             45.9     11-03    139  |  101  |  24<H>  ----------------------------<  42<LL>  4.0   |  27  |  1.20    Ca    8.1<L>      03 Nov 2019 07:12    TPro  6.9  /  Alb  3.5  /  TBili  0.6  /  DBili  x   /  AST  24  /  ALT  12  /  AlkPhos  72  11-01    proBNP:   Lipid Profile:   HgA1c:   TSH:               TELEMETRY: SR	    ECG:  	  RADIOLOGY:   DIAGNOSTIC TESTING:  [ ] Echocardiogram:  [ ]  Catheterization:  [ ] Stress Test:    OTHER: Subjective: Patient seen and examined. No new events except as noted.   Wheezing   no cp or sob     REVIEW OF SYSTEMS:    CONSTITUTIONAL: + weakness, fevers or chills  EYES/ENT: No visual changes;  No vertigo or throat pain   NECK: No pain or stiffness  RESPIRATORY: No cough, wheezing, hemoptysis; No shortness of breath  CARDIOVASCULAR: No chest pain or palpitations  GASTROINTESTINAL: No abdominal or epigastric pain. No nausea, vomiting, or hematemesis; No diarrhea or constipation. No melena or hematochezia.  GENITOURINARY: No dysuria, frequency or hematuria  NEUROLOGICAL: No numbness or weakness  SKIN: No itching, burning, rashes, or lesions   All other review of systems is negative unless indicated above.    MEDICATIONS:  MEDICATIONS  (STANDING):  albuterol/ipratropium for Nebulization. 3 milliLiter(s) Nebulizer once  atorvastatin 20 milliGRAM(s) Oral at bedtime  clopidogrel Tablet 75 milliGRAM(s) Oral daily  dextrose 5%. 1000 milliLiter(s) (50 mL/Hr) IV Continuous <Continuous>  dextrose 50% Injectable 12.5 Gram(s) IV Push once  dextrose 50% Injectable 25 Gram(s) IV Push once  dextrose 50% Injectable 25 Gram(s) IV Push once  influenza   Vaccine 0.5 milliLiter(s) IntraMuscular once  insulin glargine Injectable (LANTUS) 80 Unit(s) SubCutaneous at bedtime  insulin lispro (HumaLOG) corrective regimen sliding scale   SubCutaneous three times a day before meals  insulin lispro (HumaLOG) corrective regimen sliding scale   SubCutaneous at bedtime  losartan 50 milliGRAM(s) Oral daily  metoprolol succinate ER 50 milliGRAM(s) Oral daily  oxyCODONE  ER Tablet 15 milliGRAM(s) Oral every 12 hours  rivaroxaban 20 milliGRAM(s) Oral with dinner  sertraline 100 milliGRAM(s) Oral daily      PHYSICAL EXAM:  T(C): 36.7 (11-03-19 @ 04:30), Max: 37 (11-02-19 @ 12:10)  HR: 52 (11-03-19 @ 04:30) (45 - 73)  BP: 136/70 (11-03-19 @ 04:30) (136/70 - 175/95)  RR: 18 (11-03-19 @ 04:30) (18 - 19)  SpO2: 92% (11-03-19 @ 04:30) (91% - 93%)  Wt(kg): --  I&O's Summary    02 Nov 2019 08:01  -  03 Nov 2019 07:00  --------------------------------------------------------  IN: 840 mL / OUT: 0 mL / NET: 840 mL          Appearance: Normal	  HEENT:   Normal oral mucosa, PERRL, EOMI	  Lymphatic: No lymphadenopathy , no edema  Cardiovascular: Irregular S1 S2, No JVD, No murmurs , Peripheral pulses palpable 2+ bilaterally  Respiratory: Bronchospasms 	  Gastrointestinal:  Soft, Non-tender, + BS	  Skin: No rashes, No ecchymoses, No cyanosis, warm to touch  Musculoskeletal: Normal range of motion, normal strength  Psychiatry:  Mood & affect appropriate  Ext: No edema      LABS:    CARDIAC MARKERS:  CARDIAC MARKERS ( 01 Nov 2019 23:48 )  x     / x     / 77 U/L / x     / x                                    14.6   8.19  )-----------( 156      ( 03 Nov 2019 07:12 )             45.9     11-03    139  |  101  |  24<H>  ----------------------------<  42<LL>  4.0   |  27  |  1.20    Ca    8.1<L>      03 Nov 2019 07:12    TPro  6.9  /  Alb  3.5  /  TBili  0.6  /  DBili  x   /  AST  24  /  ALT  12  /  AlkPhos  72  11-01    proBNP:   Lipid Profile:   HgA1c:   TSH:               TELEMETRY: Aflutter 	    ECG:  	  RADIOLOGY:   DIAGNOSTIC TESTING:  [ ] Echocardiogram:  [ ]  Catheterization:  [ ] Stress Test:    OTHER:

## 2019-11-03 NOTE — PHYSICAL THERAPY INITIAL EVALUATION ADULT - ADDITIONAL COMMENTS
As per the pt, he lives alone in a pvt house alone, -NIK, Independent in Adls and functional ambulation. pt has RW and SC but doesn't use.

## 2019-11-03 NOTE — PROGRESS NOTE ADULT - SUBJECTIVE AND OBJECTIVE BOX
Patient is a 78y old  Male who presents with a chief complaint of syncope (03 Nov 2019 09:27)      SUBJECTIVE / OVERNIGHT EVENTS: ptn is calm, answers all questions appropriately, had an episode of confusion earlier, hasnt needed any PRN pain meds, daughter concerned her father will be addicted to Oxycodone. I explained to her his pain is acute post rib Fx and addiction at this point is not a concern. I expressed i would like him to be comfrotable and pain free, she agreed and wants to stick to the plan, Will lower Oxycodone ER dose to 10 but if he will require breakthrough pain doses will raise back to 15 mg in am. daughter agrees, should be on aspiration precautions, eating well, PT eval done, recommend LISA    MEDICATIONS  (STANDING):  albuterol/ipratropium for Nebulization. 3 milliLiter(s) Nebulizer once  atorvastatin 20 milliGRAM(s) Oral at bedtime  clopidogrel Tablet 75 milliGRAM(s) Oral daily  dextrose 5%. 1000 milliLiter(s) (50 mL/Hr) IV Continuous <Continuous>  dextrose 50% Injectable 12.5 Gram(s) IV Push once  dextrose 50% Injectable 25 Gram(s) IV Push once  dextrose 50% Injectable 25 Gram(s) IV Push once  influenza   Vaccine 0.5 milliLiter(s) IntraMuscular once  insulin glargine Injectable (LANTUS) 80 Unit(s) SubCutaneous at bedtime  insulin lispro (HumaLOG) corrective regimen sliding scale   SubCutaneous three times a day before meals  insulin lispro (HumaLOG) corrective regimen sliding scale   SubCutaneous at bedtime  losartan 50 milliGRAM(s) Oral daily  metoprolol succinate ER 50 milliGRAM(s) Oral daily  oxyCODONE  ER Tablet 10 milliGRAM(s) Oral every 12 hours  rivaroxaban 20 milliGRAM(s) Oral with dinner  sertraline 100 milliGRAM(s) Oral daily    MEDICATIONS  (PRN):  acetaminophen   Tablet .. 650 milliGRAM(s) Oral every 6 hours PRN Temp greater or equal to 38C (100.4F), Mild Pain (1 - 3)  acetaminophen   Tablet .. 650 milliGRAM(s) Oral every 6 hours PRN Moderate Pain (4 - 6)  dextrose 40% Gel 15 Gram(s) Oral once PRN Blood Glucose LESS THAN 70 milliGRAM(s)/deciliter  diazepam    Tablet 5 milliGRAM(s) Oral daily PRN anxiety  glucagon  Injectable 1 milliGRAM(s) IntraMuscular once PRN Glucose LESS THAN 70 milligrams/deciliter  oxyCODONE    IR 10 milliGRAM(s) Oral every 6 hours PRN Moderate Pain (4 - 6)  polyethylene glycol 3350 17 Gram(s) Oral daily PRN Constipation      Vital Signs Last 24 Hrs  T(F): 98.2 (11-03-19 @ 11:53), Max: 98.2 (11-02-19 @ 23:38)  HR: 48 (11-03-19 @ 11:53) (48 - 73)  BP: 146/71 (11-03-19 @ 11:53) (136/70 - 175/95)  RR: 18 (11-03-19 @ 11:53) (18 - 18)  SpO2: 90% (11-03-19 @ 11:53) (90% - 93%)  Telemetry:   CAPILLARY BLOOD GLUCOSE      POCT Blood Glucose.: 147 mg/dL (03 Nov 2019 12:28)  POCT Blood Glucose.: 163 mg/dL (03 Nov 2019 08:49)  POCT Blood Glucose.: 68 mg/dL (03 Nov 2019 08:33)  POCT Blood Glucose.: 60 mg/dL (03 Nov 2019 08:16)  POCT Blood Glucose.: 63 mg/dL (03 Nov 2019 08:10)  POCT Blood Glucose.: 135 mg/dL (02 Nov 2019 21:23)  POCT Blood Glucose.: 157 mg/dL (02 Nov 2019 17:22)    I&O's Summary    02 Nov 2019 08:01  -  03 Nov 2019 07:00  --------------------------------------------------------  IN: 840 mL / OUT: 0 mL / NET: 840 mL    03 Nov 2019 07:01  -  03 Nov 2019 15:38  --------------------------------------------------------  IN: 360 mL / OUT: 0 mL / NET: 360 mL        PHYSICAL EXAM:  GENERAL: NAD, well-developed  HEAD:  Atraumatic, Normocephalic  EYES: EOMI, PERRLA, conjunctiva and sclera clear  NECK: Supple, No JVD  CHEST/LUNG: Clear to auscultation bilaterally; No wheeze  HEART: Regular rate and rhythm; No murmurs, rubs, or gallops  ABDOMEN: Soft, Nontender, Nondistended; Bowel sounds present  EXTREMITIES:  2+ Peripheral Pulses, No clubbing, cyanosis, or edema  PSYCH: AAOx3  NEUROLOGY: non-focal  SKIN: No rashes or lesions    LABS:                        14.6   8.19  )-----------( 156      ( 03 Nov 2019 07:12 )             45.9     11-03    139  |  101  |  24<H>  ----------------------------<  42<LL>  4.0   |  27  |  1.20    Ca    8.1<L>      03 Nov 2019 07:12        CARDIAC MARKERS ( 01 Nov 2019 23:48 )  x     / x     / 77 U/L / x     / x              RADIOLOGY & ADDITIONAL TESTS:    Imaging Personally Reviewed:    Consultant(s) Notes Reviewed:      Care Discussed with Consultants/Other Providers:

## 2019-11-04 LAB
ANION GAP SERPL CALC-SCNC: 10 MMOL/L — SIGNIFICANT CHANGE UP (ref 5–17)
BUN SERPL-MCNC: 32 MG/DL — HIGH (ref 7–23)
CALCIUM SERPL-MCNC: 8.6 MG/DL — SIGNIFICANT CHANGE UP (ref 8.4–10.5)
CHLORIDE SERPL-SCNC: 101 MMOL/L — SIGNIFICANT CHANGE UP (ref 96–108)
CO2 SERPL-SCNC: 25 MMOL/L — SIGNIFICANT CHANGE UP (ref 22–31)
CREAT SERPL-MCNC: 1.2 MG/DL — SIGNIFICANT CHANGE UP (ref 0.5–1.3)
GLUCOSE BLDC GLUCOMTR-MCNC: 132 MG/DL — HIGH (ref 70–99)
GLUCOSE BLDC GLUCOMTR-MCNC: 143 MG/DL — HIGH (ref 70–99)
GLUCOSE BLDC GLUCOMTR-MCNC: 143 MG/DL — HIGH (ref 70–99)
GLUCOSE BLDC GLUCOMTR-MCNC: 167 MG/DL — HIGH (ref 70–99)
GLUCOSE BLDC GLUCOMTR-MCNC: 52 MG/DL — LOW (ref 70–99)
GLUCOSE BLDC GLUCOMTR-MCNC: 59 MG/DL — LOW (ref 70–99)
GLUCOSE BLDC GLUCOMTR-MCNC: 98 MG/DL — SIGNIFICANT CHANGE UP (ref 70–99)
GLUCOSE SERPL-MCNC: 73 MG/DL — SIGNIFICANT CHANGE UP (ref 70–99)
MAGNESIUM SERPL-MCNC: 1.9 MG/DL — SIGNIFICANT CHANGE UP (ref 1.6–2.6)
PHOSPHATE SERPL-MCNC: 3.4 MG/DL — SIGNIFICANT CHANGE UP (ref 2.5–4.5)
POTASSIUM SERPL-MCNC: 4 MMOL/L — SIGNIFICANT CHANGE UP (ref 3.5–5.3)
POTASSIUM SERPL-SCNC: 4 MMOL/L — SIGNIFICANT CHANGE UP (ref 3.5–5.3)
SODIUM SERPL-SCNC: 136 MMOL/L — SIGNIFICANT CHANGE UP (ref 135–145)

## 2019-11-04 PROCEDURE — 93010 ELECTROCARDIOGRAM REPORT: CPT

## 2019-11-04 PROCEDURE — 93306 TTE W/DOPPLER COMPLETE: CPT | Mod: 26

## 2019-11-04 RX ORDER — HYDRALAZINE HCL 50 MG
10 TABLET ORAL ONCE
Refills: 0 | Status: COMPLETED | OUTPATIENT
Start: 2019-11-04 | End: 2019-11-05

## 2019-11-04 RX ORDER — MAGNESIUM SULFATE 500 MG/ML
1 VIAL (ML) INJECTION ONCE
Refills: 0 | Status: COMPLETED | OUTPATIENT
Start: 2019-11-04 | End: 2019-11-05

## 2019-11-04 RX ORDER — INSULIN LISPRO 100/ML
4 VIAL (ML) SUBCUTANEOUS
Refills: 0 | Status: DISCONTINUED | OUTPATIENT
Start: 2019-11-05 | End: 2019-11-06

## 2019-11-04 RX ORDER — HYDRALAZINE HCL 50 MG
10 TABLET ORAL ONCE
Refills: 0 | Status: COMPLETED | OUTPATIENT
Start: 2019-11-04 | End: 2019-11-04

## 2019-11-04 RX ORDER — INSULIN GLARGINE 100 [IU]/ML
60 INJECTION, SOLUTION SUBCUTANEOUS AT BEDTIME
Refills: 0 | Status: DISCONTINUED | OUTPATIENT
Start: 2019-11-04 | End: 2019-11-05

## 2019-11-04 RX ORDER — METOPROLOL TARTRATE 50 MG
25 TABLET ORAL DAILY
Refills: 0 | Status: DISCONTINUED | OUTPATIENT
Start: 2019-11-05 | End: 2019-11-06

## 2019-11-04 RX ADMIN — SERTRALINE 100 MILLIGRAM(S): 25 TABLET, FILM COATED ORAL at 12:47

## 2019-11-04 RX ADMIN — OXYCODONE HYDROCHLORIDE 10 MILLIGRAM(S): 5 TABLET ORAL at 05:25

## 2019-11-04 RX ADMIN — OXYCODONE HYDROCHLORIDE 10 MILLIGRAM(S): 5 TABLET ORAL at 05:55

## 2019-11-04 RX ADMIN — OXYCODONE HYDROCHLORIDE 10 MILLIGRAM(S): 5 TABLET ORAL at 18:43

## 2019-11-04 RX ADMIN — POLYETHYLENE GLYCOL 3350 17 GRAM(S): 17 POWDER, FOR SOLUTION ORAL at 05:25

## 2019-11-04 RX ADMIN — LOSARTAN POTASSIUM 50 MILLIGRAM(S): 100 TABLET, FILM COATED ORAL at 05:25

## 2019-11-04 RX ADMIN — Medication 10 MILLIGRAM(S): at 22:00

## 2019-11-04 RX ADMIN — CLOPIDOGREL BISULFATE 75 MILLIGRAM(S): 75 TABLET, FILM COATED ORAL at 12:47

## 2019-11-04 RX ADMIN — INSULIN GLARGINE 60 UNIT(S): 100 INJECTION, SOLUTION SUBCUTANEOUS at 22:00

## 2019-11-04 RX ADMIN — RIVAROXABAN 20 MILLIGRAM(S): KIT at 17:36

## 2019-11-04 RX ADMIN — ATORVASTATIN CALCIUM 20 MILLIGRAM(S): 80 TABLET, FILM COATED ORAL at 22:01

## 2019-11-04 RX ADMIN — OXYCODONE HYDROCHLORIDE 10 MILLIGRAM(S): 5 TABLET ORAL at 18:13

## 2019-11-04 RX ADMIN — Medication 3 MILLILITER(S): at 04:38

## 2019-11-04 NOTE — PROVIDER CONTACT NOTE (OTHER) - ASSESSMENT
Patient A&Ox2, confused. Patient is awake. VSS. Patient denies feeling weakness, headache, shakiness, or dizziness. Hypoglycemic protocol initiated.

## 2019-11-04 NOTE — CHART NOTE - NSCHARTNOTEFT_GEN_A_CORE
Notified by RN pt with manual SBP of200  Pt n Notified by RN pt with manual SBP of200  Pt examined at a bedside, NAD, NON- toxic appearing, very pleasant A&O  Pt denies Cp, sob, and pain, HA, back pain, anxiety, weakness, dizziness.     GEN: NAD, non-toxic  reps: unlabored breathing, good air entry b/l  CV: RRR, no murmurs, clicks, no JVD, Abdominal aorta without bruit; Abdominal aorta normal on palpation; Renal arteries without bruits;  abd: soft, NT, ND  MS: no edema  neuro: grossly intact     79 yo male with hx of HTN, HLD, CAD with history of open heart surgery on anticoagulation and history of open cholecystectomy who presents sp mechanical fall 5 days ago.   Pt now with hypertension on routine vital check. Pt asymptomatic, rest VSS, physical exam unremarkable.   - STAT hydralazine 10 mg IVP x1  - cont to monitor VS closely  -will endorse to primary team in AM Notified by RN pt with manual SBP of200  Pt examined at a bedside, NAD, NON- toxic appearing, very pleasant A&O  Pt denies Cp, sob, and pain, HA, back pain, anxiety, weakness, dizziness.     GEN: NAD, non-toxic  reps: unlabored breathing, good air entry b/l  CV: RRR, no murmurs, clicks, no JVD, Abdominal aorta without bruit; Abdominal aorta normal on palpation; Renal arteries without bruits;  abd: soft, NT, ND  MS: no edema  neuro: grossly intact     79 yo male with hx of HTN, HLD, CAD with history of open heart surgery on anticoagulation and history of open cholecystectomy who presents sp mechanical fall 5 days ago.   Pt now with hypertension on routine vital check. Pt asymptomatic, rest VSS, physical exam unremarkable. no events on tele   - STAT hydralazine 10 mg IVP x1  - cont to monitor VS closely  -will endorse to primary team in AM Notified by RN pt with manual SBP of200  Pt examined at a bedside, NAD, NON- toxic appearing, very pleasant A&O  Pt denies Cp, sob, and pain, HA, back pain, anxiety, weakness, dizziness.     GEN: NAD, non-toxic  reps: unlabored breathing, good air entry b/l  CV: RRR, no murmurs, clicks, no JVD, Abdominal aorta without bruit; Abdominal aorta normal on palpation; Renal arteries without bruits;  abd: soft, NT, ND  MS: no edema  neuro: grossly intact     79 yo male with hx of HTN, HLD, CAD with history of open heart surgery on anticoagulation and history of open cholecystectomy who presents sp mechanical fall 5 days ago.   Pt now with hypertension on routine vital check. Pt asymptomatic, rest VSS, physical exam unremarkable. no events on tele   - STAT hydralazine 10 mg IVP x1  - cont to monitor VS closely  -will endorse to primary team in AM    Addendum  repeat /84, BP down trending  appropriately pt continues  to be asymptomatic, 10 mg PO hydralazine given.

## 2019-11-04 NOTE — CHART NOTE - NSCHARTNOTEFT_GEN_A_CORE
Called by RN for episode of hypoglycemia this am - patient asymptomatic.  Basal insulin reduced starting tonight - pre-meal Humalog added to regimen.    Separately, patient now in SR on tele - confirmed via EKG.    Gwen Purdy NP  [phone] Called by RN for episode of hypoglycemia this am - patient asymptomatic.  Basal insulin reduced starting tonight - pre-meal Humalog added to regimen.    Separately, patient now in SR on tele - previously aflutter with SVR - SR confirmed via EKG.    Gwen Purdy NP  [phone]

## 2019-11-04 NOTE — PROGRESS NOTE ADULT - ASSESSMENT
77 yo male s/p mechanical fall, admitted w intractable pain 2/2  2 right rib fx and nonspecific findings of a RUL lung nodule, which needs to be followed up on w rpt CT in 3 mo.  Pain controlled, ptn is confused, will DC Oxy ER and keep on prn Oxy IR for severe pain.  ptn is obese, on aspiration precautions,   eating well,   PT eval done, recommend LISA, awaiting choices for LISA placement from daughter   miralax prn constipation,   cont outptn meds  on chronic AC

## 2019-11-04 NOTE — PROGRESS NOTE ADULT - SUBJECTIVE AND OBJECTIVE BOX
Patient is a 78y old  Male who presents with a chief complaint of syncope (04 Nov 2019 11:20)      SUBJECTIVE / OVERNIGHT EVENTS: ptn is pain free, seems a little confused    MEDICATIONS  (STANDING):  atorvastatin 20 milliGRAM(s) Oral at bedtime  clopidogrel Tablet 75 milliGRAM(s) Oral daily  dextrose 5%. 1000 milliLiter(s) (50 mL/Hr) IV Continuous <Continuous>  dextrose 50% Injectable 12.5 Gram(s) IV Push once  dextrose 50% Injectable 25 Gram(s) IV Push once  dextrose 50% Injectable 25 Gram(s) IV Push once  influenza   Vaccine 0.5 milliLiter(s) IntraMuscular once  insulin glargine Injectable (LANTUS) 60 Unit(s) SubCutaneous at bedtime  insulin lispro (HumaLOG) corrective regimen sliding scale   SubCutaneous three times a day before meals  insulin lispro (HumaLOG) corrective regimen sliding scale   SubCutaneous at bedtime  losartan 50 milliGRAM(s) Oral daily  rivaroxaban 20 milliGRAM(s) Oral with dinner  sertraline 100 milliGRAM(s) Oral daily    MEDICATIONS  (PRN):  acetaminophen   Tablet .. 650 milliGRAM(s) Oral every 6 hours PRN Temp greater or equal to 38C (100.4F), Mild Pain (1 - 3)  acetaminophen   Tablet .. 650 milliGRAM(s) Oral every 6 hours PRN Moderate Pain (4 - 6)  dextrose 40% Gel 15 Gram(s) Oral once PRN Blood Glucose LESS THAN 70 milliGRAM(s)/deciliter  diazepam    Tablet 5 milliGRAM(s) Oral daily PRN anxiety  glucagon  Injectable 1 milliGRAM(s) IntraMuscular once PRN Glucose LESS THAN 70 milligrams/deciliter  oxyCODONE    IR 10 milliGRAM(s) Oral every 6 hours PRN Moderate Pain (4 - 6)  polyethylene glycol 3350 17 Gram(s) Oral daily PRN Constipation      Vital Signs Last 24 Hrs  T(F): 98.2 (11-04-19 @ 12:33), Max: 98.2 (11-04-19 @ 12:33)  HR: 74 (11-04-19 @ 12:33) (48 - 74)  BP: 166/73 (11-04-19 @ 12:33) (145/69 - 166/73)  RR: 18 (11-04-19 @ 12:33) (18 - 18)  SpO2: 93% (11-04-19 @ 12:33) (91% - 97%)  Telemetry:   CAPILLARY BLOOD GLUCOSE      POCT Blood Glucose.: 98 mg/dL (04 Nov 2019 18:01)  POCT Blood Glucose.: 132 mg/dL (04 Nov 2019 12:51)  POCT Blood Glucose.: 143 mg/dL (04 Nov 2019 08:54)  POCT Blood Glucose.: 143 mg/dL (04 Nov 2019 08:54)  POCT Blood Glucose.: 59 mg/dL (04 Nov 2019 08:35)  POCT Blood Glucose.: 52 mg/dL (04 Nov 2019 08:34)  POCT Blood Glucose.: 161 mg/dL (03 Nov 2019 21:44)    I&O's Summary    03 Nov 2019 07:01  -  04 Nov 2019 07:00  --------------------------------------------------------  IN: 960 mL / OUT: 200 mL / NET: 760 mL    04 Nov 2019 07:01  -  04 Nov 2019 21:23  --------------------------------------------------------  IN: 520 mL / OUT: 0 mL / NET: 520 mL        PHYSICAL EXAM:  GENERAL: NAD, well-developed  HEAD:  Atraumatic, Normocephalic  EYES: EOMI, PERRLA, conjunctiva and sclera clear  NECK: Supple, No JVD  CHEST/LUNG: Clear to auscultation bilaterally; No wheeze  HEART: Regular rate and rhythm; No murmurs, rubs, or gallops  ABDOMEN: Soft, Nontender, Nondistended; Bowel sounds present  EXTREMITIES:  2+ Peripheral Pulses, No clubbing, cyanosis, or edema  PSYCH: AAOx3  NEUROLOGY: non-focal  SKIN: No rashes or lesions    LABS:                        14.6   8.19  )-----------( 156      ( 03 Nov 2019 07:12 )             45.9     11-04    136  |  101  |  32<H>  ----------------------------<  73  4.0   |  25  |  1.20    Ca    8.6      04 Nov 2019 06:17  Phos  3.4     11-04  Mg     1.9     11-04                RADIOLOGY & ADDITIONAL TESTS:    Imaging Personally Reviewed:    Consultant(s) Notes Reviewed:      Care Discussed with Consultants/Other Providers:

## 2019-11-04 NOTE — PROGRESS NOTE ADULT - SUBJECTIVE AND OBJECTIVE BOX
Subjective: Patient seen and examined. No new events except as noted.   feels better   no cp or palpitations      REVIEW OF SYSTEMS:    CONSTITUTIONAL: + weakness, fevers or chills  EYES/ENT: No visual changes;  No vertigo or throat pain   NECK: No pain or stiffness  RESPIRATORY:+ cough, wheezing, hemoptysis; + shortness of breath  CARDIOVASCULAR: No chest pain or palpitations  GASTROINTESTINAL: No abdominal or epigastric pain. No nausea, vomiting, or hematemesis; No diarrhea or constipation. No melena or hematochezia.  GENITOURINARY: No dysuria, frequency or hematuria  NEUROLOGICAL: No numbness or weakness  SKIN: No itching, burning, rashes, or lesions   All other review of systems is negative unless indicated above.    MEDICATIONS:  MEDICATIONS  (STANDING):  atorvastatin 20 milliGRAM(s) Oral at bedtime  clopidogrel Tablet 75 milliGRAM(s) Oral daily  dextrose 5%. 1000 milliLiter(s) (50 mL/Hr) IV Continuous <Continuous>  dextrose 50% Injectable 12.5 Gram(s) IV Push once  dextrose 50% Injectable 25 Gram(s) IV Push once  dextrose 50% Injectable 25 Gram(s) IV Push once  influenza   Vaccine 0.5 milliLiter(s) IntraMuscular once  insulin glargine Injectable (LANTUS) 60 Unit(s) SubCutaneous at bedtime  insulin lispro (HumaLOG) corrective regimen sliding scale   SubCutaneous three times a day before meals  insulin lispro (HumaLOG) corrective regimen sliding scale   SubCutaneous at bedtime  losartan 50 milliGRAM(s) Oral daily  metoprolol succinate ER 50 milliGRAM(s) Oral daily  oxyCODONE  ER Tablet 10 milliGRAM(s) Oral every 12 hours  rivaroxaban 20 milliGRAM(s) Oral with dinner  sertraline 100 milliGRAM(s) Oral daily      PHYSICAL EXAM:  T(C): 36.7 (11-04-19 @ 03:56), Max: 36.8 (11-03-19 @ 11:53)  HR: 48 (11-04-19 @ 03:56) (48 - 50)  BP: 158/70 (11-04-19 @ 03:56) (145/69 - 158/70)  RR: 18 (11-04-19 @ 03:56) (18 - 18)  SpO2: 97% (11-04-19 @ 03:56) (90% - 97%)  Wt(kg): --  I&O's Summary    03 Nov 2019 07:01  -  04 Nov 2019 07:00  --------------------------------------------------------  IN: 960 mL / OUT: 200 mL / NET: 760 mL          Appearance: Normal	  HEENT:   Normal oral mucosa, PERRL, EOMI	  Lymphatic: No lymphadenopathy , no edema  Cardiovascular: Irregular S1 S2, No JVD, No murmurs , Peripheral pulses palpable 2+ bilaterally  Respiratory: +bronchospasm   Gastrointestinal:  Soft, Non-tender, + BS	  Skin: No rashes, No ecchymoses, No cyanosis, warm to touch  Musculoskeletal: Normal range of motion, normal strength  Psychiatry:  Mood & affect appropriate  Ext: No edema      LABS:    CARDIAC MARKERS:  CARDIAC MARKERS ( 01 Nov 2019 23:48 )  x     / x     / 77 U/L / x     / x                                    14.6   8.19  )-----------( 156      ( 03 Nov 2019 07:12 )             45.9     11-04    136  |  101  |  32<H>  ----------------------------<  73  4.0   |  25  |  1.20    Ca    8.6      04 Nov 2019 06:17  Phos  3.4     11-04  Mg     1.9     11-04      proBNP:   Lipid Profile:   HgA1c:   TSH:     0          TELEMETRY: 	  AFlutter   ECG:  	  RADIOLOGY:   DIAGNOSTIC TESTING:  [ ] Echocardiogram:  [ ]  Catheterization:  [ ] Stress Test:    OTHER:

## 2019-11-05 LAB
ANION GAP SERPL CALC-SCNC: 11 MMOL/L — SIGNIFICANT CHANGE UP (ref 5–17)
BUN SERPL-MCNC: 24 MG/DL — HIGH (ref 7–23)
CALCIUM SERPL-MCNC: 7.9 MG/DL — LOW (ref 8.4–10.5)
CHLORIDE SERPL-SCNC: 101 MMOL/L — SIGNIFICANT CHANGE UP (ref 96–108)
CO2 SERPL-SCNC: 25 MMOL/L — SIGNIFICANT CHANGE UP (ref 22–31)
CREAT SERPL-MCNC: 0.94 MG/DL — SIGNIFICANT CHANGE UP (ref 0.5–1.3)
GLUCOSE BLDC GLUCOMTR-MCNC: 103 MG/DL — HIGH (ref 70–99)
GLUCOSE BLDC GLUCOMTR-MCNC: 139 MG/DL — HIGH (ref 70–99)
GLUCOSE BLDC GLUCOMTR-MCNC: 141 MG/DL — HIGH (ref 70–99)
GLUCOSE BLDC GLUCOMTR-MCNC: 189 MG/DL — HIGH (ref 70–99)
GLUCOSE BLDC GLUCOMTR-MCNC: 54 MG/DL — LOW (ref 70–99)
GLUCOSE BLDC GLUCOMTR-MCNC: 58 MG/DL — LOW (ref 70–99)
GLUCOSE BLDC GLUCOMTR-MCNC: 74 MG/DL — SIGNIFICANT CHANGE UP (ref 70–99)
GLUCOSE SERPL-MCNC: 123 MG/DL — HIGH (ref 70–99)
MAGNESIUM SERPL-MCNC: 2.1 MG/DL — SIGNIFICANT CHANGE UP (ref 1.6–2.6)
POTASSIUM SERPL-MCNC: 4.1 MMOL/L — SIGNIFICANT CHANGE UP (ref 3.5–5.3)
POTASSIUM SERPL-SCNC: 4.1 MMOL/L — SIGNIFICANT CHANGE UP (ref 3.5–5.3)
SODIUM SERPL-SCNC: 137 MMOL/L — SIGNIFICANT CHANGE UP (ref 135–145)

## 2019-11-05 RX ORDER — LANOLIN ALCOHOL/MO/W.PET/CERES
5 CREAM (GRAM) TOPICAL ONCE
Refills: 0 | Status: COMPLETED | OUTPATIENT
Start: 2019-11-05 | End: 2019-11-05

## 2019-11-05 RX ORDER — HALOPERIDOL DECANOATE 100 MG/ML
0.5 INJECTION INTRAMUSCULAR ONCE
Refills: 0 | Status: COMPLETED | OUTPATIENT
Start: 2019-11-05 | End: 2019-11-05

## 2019-11-05 RX ORDER — IPRATROPIUM/ALBUTEROL SULFATE 18-103MCG
3 AEROSOL WITH ADAPTER (GRAM) INHALATION EVERY 6 HOURS
Refills: 0 | Status: DISCONTINUED | OUTPATIENT
Start: 2019-11-05 | End: 2019-11-06

## 2019-11-05 RX ORDER — HYDRALAZINE HCL 50 MG
10 TABLET ORAL ONCE
Refills: 0 | Status: COMPLETED | OUTPATIENT
Start: 2019-11-05 | End: 2019-11-05

## 2019-11-05 RX ORDER — INSULIN GLARGINE 100 [IU]/ML
20 INJECTION, SOLUTION SUBCUTANEOUS AT BEDTIME
Refills: 0 | Status: DISCONTINUED | OUTPATIENT
Start: 2019-11-05 | End: 2019-11-06

## 2019-11-05 RX ADMIN — Medication 3 MILLILITER(S): at 13:42

## 2019-11-05 RX ADMIN — ATORVASTATIN CALCIUM 20 MILLIGRAM(S): 80 TABLET, FILM COATED ORAL at 21:22

## 2019-11-05 RX ADMIN — Medication 10 MILLIGRAM(S): at 17:16

## 2019-11-05 RX ADMIN — INSULIN GLARGINE 20 UNIT(S): 100 INJECTION, SOLUTION SUBCUTANEOUS at 21:48

## 2019-11-05 RX ADMIN — LOSARTAN POTASSIUM 50 MILLIGRAM(S): 100 TABLET, FILM COATED ORAL at 06:34

## 2019-11-05 RX ADMIN — Medication 4 UNIT(S): at 18:14

## 2019-11-05 RX ADMIN — SERTRALINE 100 MILLIGRAM(S): 25 TABLET, FILM COATED ORAL at 13:33

## 2019-11-05 RX ADMIN — Medication 4 UNIT(S): at 13:33

## 2019-11-05 RX ADMIN — Medication 100 GRAM(S): at 00:12

## 2019-11-05 RX ADMIN — Medication 10 MILLIGRAM(S): at 00:12

## 2019-11-05 RX ADMIN — Medication 1: at 18:14

## 2019-11-05 RX ADMIN — Medication 25 MILLIGRAM(S): at 06:34

## 2019-11-05 RX ADMIN — Medication 5 MILLIGRAM(S): at 02:28

## 2019-11-05 RX ADMIN — CLOPIDOGREL BISULFATE 75 MILLIGRAM(S): 75 TABLET, FILM COATED ORAL at 13:33

## 2019-11-05 RX ADMIN — HALOPERIDOL DECANOATE 0.5 MILLIGRAM(S): 100 INJECTION INTRAMUSCULAR at 16:54

## 2019-11-05 RX ADMIN — Medication 5 MILLIGRAM(S): at 23:21

## 2019-11-05 RX ADMIN — RIVAROXABAN 20 MILLIGRAM(S): KIT at 17:16

## 2019-11-05 NOTE — PROVIDER CONTACT NOTE (OTHER) - BACKGROUND
Pt admitted s/p fall - multiple fractures of ribs right side. PMH of DM, HTN, HLD, obesity, CAD with hx open heart surgery

## 2019-11-05 NOTE — PROGRESS NOTE ADULT - SUBJECTIVE AND OBJECTIVE BOX
Patient is a 78y old  Male who presents with a chief complaint of syncope (05 Nov 2019 10:27)      SUBJECTIVE / OVERNIGHT EVENTS: ptn is agitated, place on prob HALDOL, awaiting placement into [facility], he is off all narcotics for pain 2/2 rib Fx post fall    MEDICATIONS  (STANDING):  atorvastatin 20 milliGRAM(s) Oral at bedtime  clopidogrel Tablet 75 milliGRAM(s) Oral daily  dextrose 5%. 1000 milliLiter(s) (50 mL/Hr) IV Continuous <Continuous>  dextrose 50% Injectable 12.5 Gram(s) IV Push once  dextrose 50% Injectable 25 Gram(s) IV Push once  dextrose 50% Injectable 25 Gram(s) IV Push once  influenza   Vaccine 0.5 milliLiter(s) IntraMuscular once  insulin glargine Injectable (LANTUS) 20 Unit(s) SubCutaneous at bedtime  insulin lispro (HumaLOG) corrective regimen sliding scale   SubCutaneous three times a day before meals  insulin lispro (HumaLOG) corrective regimen sliding scale   SubCutaneous at bedtime  insulin lispro Injectable (HumaLOG) 4 Unit(s) SubCutaneous three times a day before meals  losartan 50 milliGRAM(s) Oral daily  metoprolol succinate ER 25 milliGRAM(s) Oral daily  rivaroxaban 20 milliGRAM(s) Oral with dinner  sertraline 100 milliGRAM(s) Oral daily    MEDICATIONS  (PRN):  acetaminophen   Tablet .. 650 milliGRAM(s) Oral every 6 hours PRN Temp greater or equal to 38C (100.4F), Mild Pain (1 - 3)  acetaminophen   Tablet .. 650 milliGRAM(s) Oral every 6 hours PRN Moderate Pain (4 - 6)  albuterol/ipratropium for Nebulization 3 milliLiter(s) Nebulizer every 6 hours PRN Wheezing  dextrose 40% Gel 15 Gram(s) Oral once PRN Blood Glucose LESS THAN 70 milliGRAM(s)/deciliter  diazepam    Tablet 5 milliGRAM(s) Oral daily PRN anxiety  glucagon  Injectable 1 milliGRAM(s) IntraMuscular once PRN Glucose LESS THAN 70 milligrams/deciliter  polyethylene glycol 3350 17 Gram(s) Oral daily PRN Constipation      Vital Signs Last 24 Hrs  T(F): 97.5 (11-05-19 @ 16:23), Max: 98.6 (11-05-19 @ 04:30)  HR: 85 (11-05-19 @ 16:23) (66 - 85)  BP: 190/64 (11-05-19 @ 16:23) (177/76 - 207/94)  RR: 20 (11-05-19 @ 16:23) (18 - 20)  SpO2: 93% (11-05-19 @ 16:23) (91% - 93%)  Telemetry:   CAPILLARY BLOOD GLUCOSE      POCT Blood Glucose.: 189 mg/dL (05 Nov 2019 17:40)  POCT Blood Glucose.: 141 mg/dL (05 Nov 2019 12:39)  POCT Blood Glucose.: 103 mg/dL (05 Nov 2019 09:32)  POCT Blood Glucose.: 74 mg/dL (05 Nov 2019 09:12)  POCT Blood Glucose.: 58 mg/dL (05 Nov 2019 08:56)  POCT Blood Glucose.: 54 mg/dL (05 Nov 2019 08:55)  POCT Blood Glucose.: 167 mg/dL (04 Nov 2019 21:37)    I&O's Summary    04 Nov 2019 07:01  -  05 Nov 2019 07:00  --------------------------------------------------------  IN: 980 mL / OUT: 300 mL / NET: 680 mL    05 Nov 2019 07:01  -  05 Nov 2019 18:46  --------------------------------------------------------  IN: 560 mL / OUT: 300 mL / NET: 260 mL        PHYSICAL EXAM:  GENERAL: NAD, well-developed  HEAD:  Atraumatic, Normocephalic  EYES: EOMI, PERRLA, conjunctiva and sclera clear  NECK: Supple, No JVD  CHEST/LUNG: Clear to auscultation bilaterally; No wheeze  HEART: Regular rate and rhythm; No murmurs, rubs, or gallops  ABDOMEN: Soft, Nontender, Nondistended; Bowel sounds present  EXTREMITIES:  2+ Peripheral Pulses, No clubbing, cyanosis, or edema  PSYCH: AAOx3  NEUROLOGY: non-focal  SKIN: No rashes or lesions    LABS:    11-05    137  |  101  |  24<H>  ----------------------------<  123<H>  4.1   |  25  |  0.94    Ca    7.9<L>      05 Nov 2019 06:03  Phos  3.4     11-04  Mg     2.1     11-05                RADIOLOGY & ADDITIONAL TESTS:    Imaging Personally Reviewed:    Consultant(s) Notes Reviewed:      Care Discussed with Consultants/Other Providers:

## 2019-11-05 NOTE — PROGRESS NOTE ADULT - SUBJECTIVE AND OBJECTIVE BOX
Subjective: Patient seen and examined. No new events except as noted.   very lethargic   REVIEW OF SYSTEMS:    CONSTITUTIONAL: + weakness, fevers or chills  EYES/ENT: No visual changes;  No vertigo or throat pain   NECK: No pain or stiffness  RESPIRATORY:+ cough, wheezing, hemoptysis; + shortness of breath  CARDIOVASCULAR: No chest pain or palpitations  GASTROINTESTINAL: No abdominal or epigastric pain. No nausea, vomiting, or hematemesis; No diarrhea or constipation. No melena or hematochezia.  GENITOURINARY: No dysuria, frequency or hematuria  NEUROLOGICAL: No numbness or weakness  SKIN: No itching, burning, rashes, or lesions   All other review of systems is negative unless indicated above.        MEDICATIONS:  MEDICATIONS  (STANDING):  atorvastatin 20 milliGRAM(s) Oral at bedtime  clopidogrel Tablet 75 milliGRAM(s) Oral daily  dextrose 5%. 1000 milliLiter(s) (50 mL/Hr) IV Continuous <Continuous>  dextrose 50% Injectable 12.5 Gram(s) IV Push once  dextrose 50% Injectable 25 Gram(s) IV Push once  dextrose 50% Injectable 25 Gram(s) IV Push once  influenza   Vaccine 0.5 milliLiter(s) IntraMuscular once  insulin glargine Injectable (LANTUS) 60 Unit(s) SubCutaneous at bedtime  insulin lispro (HumaLOG) corrective regimen sliding scale   SubCutaneous three times a day before meals  insulin lispro (HumaLOG) corrective regimen sliding scale   SubCutaneous at bedtime  insulin lispro Injectable (HumaLOG) 4 Unit(s) SubCutaneous three times a day before meals  losartan 50 milliGRAM(s) Oral daily  metoprolol succinate ER 25 milliGRAM(s) Oral daily  rivaroxaban 20 milliGRAM(s) Oral with dinner  sertraline 100 milliGRAM(s) Oral daily      PHYSICAL EXAM:  T(C): 37 (11-05-19 @ 04:30), Max: 37 (11-05-19 @ 04:30)  HR: 69 (11-05-19 @ 06:29) (69 - 76)  BP: 196/70 (11-05-19 @ 09:05) (166/73 - 207/94)  RR: 18 (11-05-19 @ 04:30) (18 - 18)  SpO2: 91% (11-05-19 @ 04:30) (91% - 93%)  Wt(kg): --  I&O's Summary    04 Nov 2019 07:01  -  05 Nov 2019 07:00  --------------------------------------------------------  IN: 980 mL / OUT: 300 mL / NET: 680 mL                Appearance: Normal	  HEENT:   Normal oral mucosa, PERRL, EOMI	  Lymphatic: No lymphadenopathy , no edema  Cardiovascular: Irregular S1 S2, No JVD, No murmurs , Peripheral pulses palpable 2+ bilaterally  Respiratory: +bronchospasm   Gastrointestinal:  Soft, Non-tender, + BS	  Skin: No rashes, No ecchymoses, No cyanosis, warm to touch  Musculoskeletal: Normal range of motion, normal strength  Psychiatry:  Mood & affect appropriate  Ext: No edema        LABS:    CARDIAC MARKERS:            11-05    137  |  101  |  24<H>  ----------------------------<  123<H>  4.1   |  25  |  0.94    Ca    7.9<L>      05 Nov 2019 06:03  Phos  3.4     11-04  Mg     2.1     11-05      proBNP:   Lipid Profile:   HgA1c:   TSH:             TELEMETRY: 	 AF   ECG:  	  RADIOLOGY:   DIAGNOSTIC TESTING:  [ ] Echocardiogram:  [ ]  Catheterization:  [ ] Stress Test:    OTHER:

## 2019-11-05 NOTE — PROGRESS NOTE ADULT - ASSESSMENT
77 yo male s/p mechanical fall, admitted w intractable pain 2/2  2 right rib fx and nonspecific findings of a RUL lung nodule, which needs to be followed up on w rpt CT in 3 mo.  Pain controlled, ptn is confused, agitated, on prn Haldol,  now off all narcotics for pain  ptn is obese, on aspiration precautions,   eating well,   PT eval done, recommend LISA, awaiting choices for LISA placement from daughter   miralax prn constipation,   cont outptn meds  on chronic AC

## 2019-11-06 ENCOUNTER — TRANSCRIPTION ENCOUNTER (OUTPATIENT)
Age: 79
End: 2019-11-06

## 2019-11-06 VITALS
HEART RATE: 81 BPM | TEMPERATURE: 98 F | DIASTOLIC BLOOD PRESSURE: 76 MMHG | SYSTOLIC BLOOD PRESSURE: 145 MMHG | RESPIRATION RATE: 18 BRPM | OXYGEN SATURATION: 94 %

## 2019-11-06 DIAGNOSIS — I10 ESSENTIAL (PRIMARY) HYPERTENSION: ICD-10-CM

## 2019-11-06 DIAGNOSIS — F41.9 ANXIETY DISORDER, UNSPECIFIED: ICD-10-CM

## 2019-11-06 LAB
GLUCOSE BLDC GLUCOMTR-MCNC: 111 MG/DL — HIGH (ref 70–99)
GLUCOSE BLDC GLUCOMTR-MCNC: 144 MG/DL — HIGH (ref 70–99)

## 2019-11-06 RX ORDER — METOPROLOL TARTRATE 50 MG
1 TABLET ORAL
Qty: 0 | Refills: 0 | DISCHARGE

## 2019-11-06 RX ORDER — CLOPIDOGREL BISULFATE 75 MG/1
1 TABLET, FILM COATED ORAL
Qty: 0 | Refills: 0 | DISCHARGE
Start: 2019-11-06

## 2019-11-06 RX ORDER — DIAZEPAM 5 MG
1 TABLET ORAL
Qty: 0 | Refills: 0 | DISCHARGE

## 2019-11-06 RX ORDER — ATORVASTATIN CALCIUM 80 MG/1
1 TABLET, FILM COATED ORAL
Qty: 0 | Refills: 0 | DISCHARGE
Start: 2019-11-06

## 2019-11-06 RX ORDER — ACETAMINOPHEN 500 MG
2 TABLET ORAL
Qty: 0 | Refills: 0 | DISCHARGE

## 2019-11-06 RX ORDER — QUETIAPINE FUMARATE 200 MG/1
12.5 TABLET, FILM COATED ORAL AT BEDTIME
Refills: 0 | Status: DISCONTINUED | OUTPATIENT
Start: 2019-11-06 | End: 2019-11-06

## 2019-11-06 RX ORDER — LOSARTAN POTASSIUM 100 MG/1
100 TABLET, FILM COATED ORAL DAILY
Refills: 0 | Status: DISCONTINUED | OUTPATIENT
Start: 2019-11-06 | End: 2019-11-06

## 2019-11-06 RX ORDER — RIVAROXABAN 15 MG-20MG
1 KIT ORAL
Qty: 0 | Refills: 0 | DISCHARGE
Start: 2019-11-06

## 2019-11-06 RX ORDER — ACETAMINOPHEN 500 MG
2 TABLET ORAL
Qty: 0 | Refills: 0 | DISCHARGE
Start: 2019-11-06

## 2019-11-06 RX ORDER — LOSARTAN POTASSIUM 100 MG/1
1 TABLET, FILM COATED ORAL
Qty: 0 | Refills: 0 | DISCHARGE

## 2019-11-06 RX ORDER — SERTRALINE 25 MG/1
1 TABLET, FILM COATED ORAL
Qty: 0 | Refills: 0 | DISCHARGE
Start: 2019-11-06

## 2019-11-06 RX ORDER — ATORVASTATIN CALCIUM 80 MG/1
1 TABLET, FILM COATED ORAL
Qty: 0 | Refills: 0 | DISCHARGE

## 2019-11-06 RX ORDER — POLYETHYLENE GLYCOL 3350 17 G/17G
17 POWDER, FOR SOLUTION ORAL
Qty: 0 | Refills: 0 | DISCHARGE
Start: 2019-11-06

## 2019-11-06 RX ORDER — HYDRALAZINE HCL 50 MG
10 TABLET ORAL ONCE
Refills: 0 | Status: COMPLETED | OUTPATIENT
Start: 2019-11-06 | End: 2019-11-06

## 2019-11-06 RX ORDER — INSULIN LISPRO 100/ML
5 VIAL (ML) SUBCUTANEOUS
Qty: 0 | Refills: 0 | DISCHARGE

## 2019-11-06 RX ORDER — SERTRALINE 25 MG/1
1 TABLET, FILM COATED ORAL
Qty: 0 | Refills: 0 | DISCHARGE

## 2019-11-06 RX ORDER — RIVAROXABAN 15 MG-20MG
1 KIT ORAL
Qty: 0 | Refills: 0 | DISCHARGE

## 2019-11-06 RX ORDER — INSULIN GLARGINE 100 [IU]/ML
80 INJECTION, SOLUTION SUBCUTANEOUS
Qty: 0 | Refills: 0 | DISCHARGE

## 2019-11-06 RX ORDER — LOSARTAN POTASSIUM 100 MG/1
1 TABLET, FILM COATED ORAL
Qty: 0 | Refills: 0 | DISCHARGE
Start: 2019-11-06

## 2019-11-06 RX ORDER — IPRATROPIUM/ALBUTEROL SULFATE 18-103MCG
3 AEROSOL WITH ADAPTER (GRAM) INHALATION
Qty: 0 | Refills: 0 | DISCHARGE
Start: 2019-11-06

## 2019-11-06 RX ORDER — METOPROLOL TARTRATE 50 MG
1 TABLET ORAL
Qty: 0 | Refills: 0 | DISCHARGE
Start: 2019-11-06

## 2019-11-06 RX ORDER — CLOPIDOGREL BISULFATE 75 MG/1
1 TABLET, FILM COATED ORAL
Qty: 0 | Refills: 0 | DISCHARGE

## 2019-11-06 RX ORDER — QUETIAPINE FUMARATE 200 MG/1
12.5 TABLET, FILM COATED ORAL
Qty: 0 | Refills: 0 | DISCHARGE
Start: 2019-11-06

## 2019-11-06 RX ORDER — DIAZEPAM 5 MG
1 TABLET ORAL
Qty: 0 | Refills: 0 | DISCHARGE
Start: 2019-11-06

## 2019-11-06 RX ADMIN — Medication 3 MILLILITER(S): at 00:07

## 2019-11-06 RX ADMIN — LOSARTAN POTASSIUM 50 MILLIGRAM(S): 100 TABLET, FILM COATED ORAL at 05:38

## 2019-11-06 RX ADMIN — Medication 25 MILLIGRAM(S): at 05:38

## 2019-11-06 RX ADMIN — Medication 4 UNIT(S): at 13:33

## 2019-11-06 RX ADMIN — SERTRALINE 100 MILLIGRAM(S): 25 TABLET, FILM COATED ORAL at 08:54

## 2019-11-06 RX ADMIN — INFLUENZA VIRUS VACCINE 0.5 MILLILITER(S): 15; 15; 15; 15 SUSPENSION INTRAMUSCULAR at 12:44

## 2019-11-06 RX ADMIN — Medication 10 MILLIGRAM(S): at 13:33

## 2019-11-06 RX ADMIN — CLOPIDOGREL BISULFATE 75 MILLIGRAM(S): 75 TABLET, FILM COATED ORAL at 08:54

## 2019-11-06 RX ADMIN — Medication 4 UNIT(S): at 08:54

## 2019-11-06 RX ADMIN — Medication 5 MILLIGRAM(S): at 00:10

## 2019-11-06 NOTE — PROGRESS NOTE ADULT - SUBJECTIVE AND OBJECTIVE BOX
Patient is a 78y old  Male who presents with a chief complaint of syncope (06 Nov 2019 11:38)      SUBJECTIVE / OVERNIGHT EVENTS: haldol prn agitation, will place on seroquel at hs, BP elevated, antiHTN meds adjusted    MEDICATIONS  (STANDING):  atorvastatin 20 milliGRAM(s) Oral at bedtime  clopidogrel Tablet 75 milliGRAM(s) Oral daily  dextrose 5%. 1000 milliLiter(s) (50 mL/Hr) IV Continuous <Continuous>  dextrose 50% Injectable 12.5 Gram(s) IV Push once  dextrose 50% Injectable 25 Gram(s) IV Push once  dextrose 50% Injectable 25 Gram(s) IV Push once  insulin glargine Injectable (LANTUS) 20 Unit(s) SubCutaneous at bedtime  insulin lispro (HumaLOG) corrective regimen sliding scale   SubCutaneous three times a day before meals  insulin lispro (HumaLOG) corrective regimen sliding scale   SubCutaneous at bedtime  insulin lispro Injectable (HumaLOG) 4 Unit(s) SubCutaneous three times a day before meals  losartan 100 milliGRAM(s) Oral daily  metoprolol succinate ER 25 milliGRAM(s) Oral daily  QUEtiapine 12.5 milliGRAM(s) Oral at bedtime  rivaroxaban 20 milliGRAM(s) Oral with dinner  sertraline 100 milliGRAM(s) Oral daily    MEDICATIONS  (PRN):  acetaminophen   Tablet .. 650 milliGRAM(s) Oral every 6 hours PRN Temp greater or equal to 38C (100.4F), Mild Pain (1 - 3)  acetaminophen   Tablet .. 650 milliGRAM(s) Oral every 6 hours PRN Moderate Pain (4 - 6)  albuterol/ipratropium for Nebulization 3 milliLiter(s) Nebulizer every 6 hours PRN Wheezing  dextrose 40% Gel 15 Gram(s) Oral once PRN Blood Glucose LESS THAN 70 milliGRAM(s)/deciliter  diazepam    Tablet 5 milliGRAM(s) Oral daily PRN anxiety  glucagon  Injectable 1 milliGRAM(s) IntraMuscular once PRN Glucose LESS THAN 70 milligrams/deciliter  polyethylene glycol 3350 17 Gram(s) Oral daily PRN Constipation      Vital Signs Last 24 Hrs  T(F): 97.1 (11-06-19 @ 09:01), Max: 98.3 (11-05-19 @ 21:14)  HR: 68 (11-06-19 @ 11:38) (63 - 85)  BP: 196/80 (11-06-19 @ 12:51) (166/84 - 196/80)  RR: 18 (11-06-19 @ 09:01) (18 - 20)  SpO2: 92% (11-06-19 @ 11:38) (92% - 94%)  Telemetry:   CAPILLARY BLOOD GLUCOSE      POCT Blood Glucose.: 144 mg/dL (06 Nov 2019 12:40)  POCT Blood Glucose.: 111 mg/dL (06 Nov 2019 08:38)  POCT Blood Glucose.: 139 mg/dL (05 Nov 2019 21:44)  POCT Blood Glucose.: 189 mg/dL (05 Nov 2019 17:40)    I&O's Summary    05 Nov 2019 07:01  -  06 Nov 2019 07:00  --------------------------------------------------------  IN: 1040 mL / OUT: 300 mL / NET: 740 mL    06 Nov 2019 07:01  -  06 Nov 2019 13:55  --------------------------------------------------------  IN: 180 mL / OUT: 350 mL / NET: -170 mL        PHYSICAL EXAM:  GENERAL: NAD, well-developed  HEAD:  Atraumatic, Normocephalic  EYES: EOMI, PERRLA, conjunctiva and sclera clear  NECK: Supple, No JVD  CHEST/LUNG: Clear to auscultation bilaterally; No wheeze  HEART: Regular rate and rhythm; No murmurs, rubs, or gallops  ABDOMEN: Soft, Nontender, Nondistended; Bowel sounds present  EXTREMITIES:  2+ Peripheral Pulses, No clubbing, cyanosis, or edema  PSYCH: AAOx3  NEUROLOGY: non-focal  SKIN: No rashes or lesions    LABS:    11-05    137  |  101  |  24<H>  ----------------------------<  123<H>  4.1   |  25  |  0.94    Ca    7.9<L>      05 Nov 2019 06:03  Mg     2.1     11-05                RADIOLOGY & ADDITIONAL TESTS:    Imaging Personally Reviewed:    Consultant(s) Notes Reviewed:      Care Discussed with Consultants/Other Providers:

## 2019-11-06 NOTE — DISCHARGE NOTE NURSING/CASE MANAGEMENT/SOCIAL WORK - PATIENT PORTAL LINK FT
You can access the FollowMyHealth Patient Portal offered by [facility] by registering at the following website: [email or web address]. By joining [employer]’s FollowMyHealth portal, you will also be able to view your health information using other applications (apps) compatible with our system.

## 2019-11-06 NOTE — DISCHARGE NOTE PROVIDER - NSDCFUSCHEDAPPT_GEN_ALL_CORE_FT
RA FOX ; 11/19/2019 ; NPP Surgon[facility] [address] [facility]  RA FOX ; 11/19/2019 ; SUZANNE IPADA [facility] [place] RA FOX ; 11/19/2019 ; NPP Surgonc [address] [facility]  RA FOX ; 11/19/2019 ; SUZANNE IPADA [facility] [place] RA FOX ; 11/19/2019 ; NPP Surgonc [address] [facility]  RA FOX ; 11/19/2019 ; SUZANNE IPADA [facility] [place]

## 2019-11-06 NOTE — DISCHARGE NOTE PROVIDER - HOSPITAL COURSE
77 yo male with hx of HTN, HLD, CAD with history of open heart surgery on anticoagulation and history of open cholecystectomy who presents sp mechanical fall 5 days ago. Patient states he was in the shower and hit his ribs on the ledge getting out of the shower. Did not hit his head or lose consciousness. Originally, felt well but over the past few days has had severe, stabbing pain in the right side of chest    Seen by card, Trauma Surg and PT and he will go to rehab today, spoke to Attending. 77 yo male with hx of HTN, HLD, CAD with history of open heart surgery on anticoagulation and history of open cholecystectomy who presents sp mechanical fall 5 days ago. Patient states he was in the shower and hit his ribs on the ledge getting out of the shower. Did not hit his head or lose consciousness. Originally, felt well but over the past few days has had severe, stabbing pain in the right side of chest    Seen by card, Trauma Surg and PT and he will go to rehab today, spoke to Attending. Seroquel 12.5 mg po at bedtime was added per Attending recomm.

## 2019-11-06 NOTE — DISCHARGE NOTE PROVIDER - NSDCMRMEDTOKEN_GEN_ALL_CORE_FT
acetaminophen 325 mg oral tablet: 2 tab(s) orally every 6 hours, As needed, Temp greater or equal to 38C (100.4F), Mild Pain (1 - 3)  acetaminophen 325 mg oral tablet: 2 tab(s) orally every 6 hours, As needed, Moderate Pain (4 - 6)  atorvastatin 20 mg oral tablet: 1 tab(s) orally once a day (at bedtime)  clopidogrel 75 mg oral tablet: 1 tab(s) orally once a day  diazePAM 5 mg oral tablet: 1 tab(s) orally once a day, As needed, anxiety  HumaLOG 100 units/mL injectable solution: 4 unit(s) injectable 3 times a day (before meals)  ipratropium-albuterol 0.5 mg-2.5 mg/3 mLinhalation solution: 3 milliliter(s) inhaled every 6 hours, As needed, Wheezing  Lantus 100 units/mL subcutaneous solution: 20 unit(s) subcutaneous once a day (at bedtime)  losartan 100 mg oral tablet: 1 tab(s) orally once a day  metoprolol succinate 25 mg oral tablet, extended release: 1 tab(s) orally once a day  polyethylene glycol 3350 oral powder for reconstitution: 17 gram(s) orally once a day, As needed, Constipation  rivaroxaban 20 mg oral tablet: 1 tab(s) orally once a day (before a meal)  sertraline 100 mg oral tablet: 1 tab(s) orally once a day acetaminophen 325 mg oral tablet: 2 tab(s) orally every 6 hours, As needed, Temp greater or equal to 38C (100.4F), Mild Pain (1 - 3)  acetaminophen 325 mg oral tablet: 2 tab(s) orally every 6 hours, As needed, Moderate Pain (4 - 6)  atorvastatin 20 mg oral tablet: 1 tab(s) orally once a day (at bedtime)  clopidogrel 75 mg oral tablet: 1 tab(s) orally once a day  diazePAM 5 mg oral tablet: 1 tab(s) orally once a day, As needed, anxiety  HumaLOG 100 units/mL injectable solution: 4 unit(s) injectable 3 times a day (before meals)  ipratropium-albuterol 0.5 mg-2.5 mg/3 mLinhalation solution: 3 milliliter(s) inhaled every 6 hours, As needed, Wheezing  Lantus 100 units/mL subcutaneous solution: 20 unit(s) subcutaneous once a day (at bedtime)  losartan 100 mg oral tablet: 1 tab(s) orally once a day  metoprolol succinate 25 mg oral tablet, extended release: 1 tab(s) orally once a day  polyethylene glycol 3350 oral powder for reconstitution: 17 gram(s) orally once a day, As needed, Constipation  QUEtiapine: 12.5 milligram(s) orally once a day (at bedtime)  rivaroxaban 20 mg oral tablet: 1 tab(s) orally once a day (before a meal)  sertraline 100 mg oral tablet: 1 tab(s) orally once a day

## 2019-11-06 NOTE — PROGRESS NOTE ADULT - PROBLEM SELECTOR PLAN 5
Rate controlled   Xarelto as ordered

## 2019-11-06 NOTE — DISCHARGE NOTE NURSING/CASE MANAGEMENT/SOCIAL WORK - NSDCPEXARELTOCOMP_GEN_ALL_CORE
Rivaroxaban/Xarelto is used to treat and prevent blood clots.  If you are not able to swallow the tablets whole, you may crush the tablets and mix them with a small amount of applesauce and promptly take within four hours. Eat some food right after you swallow the mixture. Take 2.5mg or 10mg tablets of Rivaroxaban/Xarelto with or without food. Take 15mg or 20mg tablets of Rivaroxaban/Xarelto with food. Never skip a dose of Rivaroxaban/Xarelto. If you take Rivaroxaban/Xarelto once a day and you forget to take your dose, take a dose as soon as you remember on the same day. If you take Rivaroxaban/Xarelto 2.5 mg twice a day and you forget to take your dose, skip the missed dose and take your next dose at your usual time. DO NOT take an extra pill to ‘catch up’. If you take Rivaroxaban/Xarelto 15 mg twice a day and you forget to take your dose, take a dose as soon as you remember on the same day. You may take 2 doses at the same time to make up for missed dose ONLY if you take a total of 30mg per day. Notify your doctor that you missed a dose. Take Rivaroxaban/Xarelto at the same time each morning and evening. Rivaroxaban/Xarelto may be taken with other medication or food.

## 2019-11-06 NOTE — DISCHARGE NOTE PROVIDER - CARE PROVIDER_API CALL
Benedict Lyle (DO)  Internal Medicine  [address], [address]  [place] [address]  Phone: [phone]  Fax: [phone]  Follow Up Time:     Arden Chavez (DO)  Cardiology; Internal Medicine  [address], [address]  [place] [address]  Phone: [phone]  Fax: [phone]  Follow Up Time:

## 2019-11-06 NOTE — PROGRESS NOTE ADULT - ASSESSMENT
77 yo male s/p mechanical fall, admitted w intractable pain 2/2  2 right rib fx and nonspecific findings of a RUL lung nodule, which needs to be followed up on w rpt CT in 3 mo.  Pain controlled, ptn is confused, agitated, on prn Haldol,  now off all narcotics for pain, place on seroquel 12.5 mg at hs  ptn is obese, on aspiration precautions,   eating well,   BP is elevated, raise cozaar to 10 mg daily  PT eval done, recommend [facility], awaiting DC to [facility]  miralax prn constipation,   cont outptn meds  on chronic AC

## 2019-11-06 NOTE — DISCHARGE NOTE NURSING/CASE MANAGEMENT/SOCIAL WORK - NSDCPEXARELTO_GEN_ALL_CORE
Rivaroxaban/Xarelto - Follow up monitoring/Rivaroxaban/Xarelto - Potential for adverse drug reactions and interactions/Rivaroxaban/Xarelto - Compliance/Rivaroxaban/Xarelto - Dietary Advice

## 2019-11-06 NOTE — PROGRESS NOTE ADULT - SUBJECTIVE AND OBJECTIVE BOX
Subjective: Patient seen and examined. No new events except as noted.   has been hypertensive     REVIEW OF SYSTEMS:    CONSTITUTIONAL: + weakness, fevers or chills  EYES/ENT: No visual changes;  No vertigo or throat pain   NECK: No pain or stiffness  RESPIRATORY: No cough, wheezing, hemoptysis; No shortness of breath  CARDIOVASCULAR: No chest pain or palpitations  GASTROINTESTINAL: No abdominal or epigastric pain. No nausea, vomiting, or hematemesis; No diarrhea or constipation. No melena or hematochezia.  GENITOURINARY: No dysuria, frequency or hematuria  NEUROLOGICAL: No numbness or weakness  SKIN: No itching, burning, rashes, or lesions   All other review of systems is negative unless indicated above.    MEDICATIONS:  MEDICATIONS  (STANDING):  atorvastatin 20 milliGRAM(s) Oral at bedtime  clopidogrel Tablet 75 milliGRAM(s) Oral daily  dextrose 5%. 1000 milliLiter(s) (50 mL/Hr) IV Continuous <Continuous>  dextrose 50% Injectable 12.5 Gram(s) IV Push once  dextrose 50% Injectable 25 Gram(s) IV Push once  dextrose 50% Injectable 25 Gram(s) IV Push once  influenza   Vaccine 0.5 milliLiter(s) IntraMuscular once  insulin glargine Injectable (LANTUS) 20 Unit(s) SubCutaneous at bedtime  insulin lispro (HumaLOG) corrective regimen sliding scale   SubCutaneous three times a day before meals  insulin lispro (HumaLOG) corrective regimen sliding scale   SubCutaneous at bedtime  insulin lispro Injectable (HumaLOG) 4 Unit(s) SubCutaneous three times a day before meals  losartan 100 milliGRAM(s) Oral daily  metoprolol succinate ER 25 milliGRAM(s) Oral daily  rivaroxaban 20 milliGRAM(s) Oral with dinner  sertraline 100 milliGRAM(s) Oral daily      PHYSICAL EXAM:  T(C): 36.2 (11-06-19 @ 09:01), Max: 36.8 (11-05-19 @ 21:14)  HR: 73 (11-06-19 @ 09:01) (66 - 85)  BP: 166/84 (11-06-19 @ 09:01) (166/84 - 194/82)  RR: 18 (11-06-19 @ 09:01) (18 - 20)  SpO2: 92% (11-06-19 @ 09:01) (92% - 93%)  Wt(kg): --  I&O's Summary    05 Nov 2019 07:01  -  06 Nov 2019 07:00  --------------------------------------------------------  IN: 1040 mL / OUT: 300 mL / NET: 740 mL            Appearance: Normal	  HEENT:   Normal oral mucosa, PERRL, EOMI	  Lymphatic: No lymphadenopathy , no edema  Cardiovascular: Irregular S1 S2, No JVD, No murmurs , Peripheral pulses palpable 2+ bilaterally  Respiratory: +bronchospasm   Gastrointestinal:  Soft, Non-tender, + BS	  Skin: No rashes, No ecchymoses, No cyanosis, warm to touch  Musculoskeletal: Normal range of motion, normal strength  Psychiatry:  Mood & affect appropriate  Ext: No edema      LABS:    CARDIAC MARKERS:            11-05    137  |  101  |  24<H>  ----------------------------<  123<H>  4.1   |  25  |  0.94    Ca    7.9<L>      05 Nov 2019 06:03  Mg     2.1     11-05      proBNP:   Lipid Profile:   HgA1c:   TSH:             TELEMETRY: 	AF    ECG:  	  RADIOLOGY:   DIAGNOSTIC TESTING:  [ ] Echocardiogram:  < from: Transthoracic Echocardiogram (11.04.19 @ 09:51) >    Patient name: RA FOX  YOB: 1940   Age: 78 (M)   MR#: [id number]  Study Date: 11/4/2019  Location: [address] [place], [place] [id number]: Angela Scott RDCS  Study quality: Technically fair  Referring Physician: Emilee Phelan MD  Blood Pressure: 158/70 mmHg  Height: 175 cm  Weight: 93 kg  BSA: 2.1 m2  Heart Rhythm: Atrial flutter  Heart Rate: 56 mmHg  ------------------------------------------------------------------------  PROCEDURE: Transthoracic echocardiogram with 2-D, M-Mode  and complete spectral and color flow Doppler.  INDICATION: Aortic aneurysm of unspecified site, without  rupture (I71.9)  ------------------------------------------------------------------------  Dimensions:    Normal Values:  LA:     3.9    2.0 - 4.0 cm  Ao:     3.3    2.0 - 3.8 cm  SEPTUM: 1.1    0.6 - 1.2 cm  PWT:    0.9    0.6 - 1.1 cm  LVIDd:  3.9    3.0 - 5.6 cm  LVIDs:  2.6    1.8 - 4.0 cm  Derived variables:  LVMI: 58 g/m2  RWT: 0.46  EF (Visual Estimate): 65 %  Doppler Peak Velocity (m/sec): TV=2.4  ------------------------------------------------------------------------  Observations:  Mitral Valve: Mitral annular calcification.  Aortic Valve/Aorta: Calcified aortic valve with normal  opening.  Normal aortic root size.  Left Atrium:Normal left atrium.  Left Ventricle: Normal left ventricular internal dimensions  and wall thicknesses.  Normal left ventricular systolic function. No segmental  wall motion abnormalities.  Right Heart: Normal right atrium. Normal right ventricular  size and function. Normal tricuspid valve. Minimal  tricuspid regurgitation. Normal pulmonic valve.  Pericardium/Pleura: Normal pericardium with no pericardial  effusion.  Hemodynamic: No evidence of pulmonary hypertension.  No PFO seen with color Doppler.  ------------------------------------------------------------------------  Conclusions:  Normal left ventricular systolic function. No segmental  wall motion abnormalities.  ------------------------------------------------------------------------  Confirmed on  11/4/2019 - 12:10:16 by Mode Fernandes M.D.  ------------------------------------------------------------------------    < end of copied text >    [ ]  Catheterization:  [ ] Stress Test:    OTHER:

## 2019-11-06 NOTE — DISCHARGE NOTE NURSING/CASE MANAGEMENT/SOCIAL WORK - NSDCPEXARELTODIET_GEN_ALL_CORE
Eat healthy foods you enjoy. Rivaroxaban/Xarelto DOES NOT have a special diet. Limit your alcohol intake.

## 2019-11-06 NOTE — DISCHARGE NOTE NURSING/CASE MANAGEMENT/SOCIAL WORK - NSDCPEXARELTOREACT_GEN_ALL_CORE
Rivaroxaban/Xarelto increases your risk for bleeding. Notify your doctor if you experience any of the following side effects: unusual bleeding or bruising, vomiting blood or coffee ground-like material, red or black stool, itching or hives, chest tightness, trouble breathing, swelling in your face or hands, swelling in your mouth or throat, change in how much or how often you urinate, red or brown urine, heavy menstrual or vaginal bleeding, or blistering or peeling skin. When Rivaroxaban/Xarelto is taken with other medicines, they can affect how it works. Taking other medications such as aspirin, antibiotics, antifungals, blood thinners, nonsteroidal anti-inflammatories, and medications that treat depression can increase your risk of bleeding. It is very important to tell your health care provider about all of the other medicines, including over-the-counter medications, herbs, and vitamins you are taking.  DO NOT start, stop, or change the dosage of any medicine, including over-the-counter medicines, vitamins, and herbal products without your doctor’s approval.  Any products containing aspirin or are nonsteroidal anti-inflammatories lessen the blood’s ability to form clots and adds to the effect of Rivaroxaban/Xarelto. Never take aspirin or medicines that contain aspirin without speaking to your doctor.

## 2019-11-06 NOTE — PROGRESS NOTE ADULT - PROBLEM SELECTOR PLAN 6
nebulizers   Pulm consult

## 2019-11-06 NOTE — DISCHARGE NOTE PROVIDER - NSDCCPCAREPLAN_GEN_ALL_CORE_FT
PRINCIPAL DISCHARGE DIAGNOSIS  Diagnosis: Closed fracture of multiple ribs of right side, initial encounter  Assessment and Plan of Treatment: stable, will go to rehab, cont pain meds      SECONDARY DISCHARGE DIAGNOSES  Diagnosis: Anxiety and depression  Assessment and Plan of Treatment: seroquel added at bedtime, cont zoloft, valium prn    Diagnosis: Hypertension  Assessment and Plan of Treatment: not well controlled, losartan increased to 100 mg daily, cont other meds    Diagnosis: Atrial flutter  Assessment and Plan of Treatment: stable, cont xarelto    Diagnosis: Diabetes mellitus  Assessment and Plan of Treatment: ADA diet, accucheck, cont lantus 20 units, premeal insulin 4 unit tid    Diagnosis: Fall, initial encounter  Assessment and Plan of Treatment: stable, rehab transfer

## 2019-11-12 PROCEDURE — 90686 IIV4 VACC NO PRSV 0.5 ML IM: CPT

## 2019-11-12 PROCEDURE — 99285 EMERGENCY DEPT VISIT HI MDM: CPT | Mod: 25

## 2019-11-12 PROCEDURE — 81001 URINALYSIS AUTO W/SCOPE: CPT

## 2019-11-12 PROCEDURE — 97116 GAIT TRAINING THERAPY: CPT

## 2019-11-12 PROCEDURE — 97162 PT EVAL MOD COMPLEX 30 MIN: CPT

## 2019-11-12 PROCEDURE — 82550 ASSAY OF CK (CPK): CPT

## 2019-11-12 PROCEDURE — 71260 CT THORAX DX C+: CPT

## 2019-11-12 PROCEDURE — 96374 THER/PROPH/DIAG INJ IV PUSH: CPT | Mod: XU

## 2019-11-12 PROCEDURE — 84100 ASSAY OF PHOSPHORUS: CPT

## 2019-11-12 PROCEDURE — 93306 TTE W/DOPPLER COMPLETE: CPT

## 2019-11-12 PROCEDURE — 80053 COMPREHEN METABOLIC PANEL: CPT

## 2019-11-12 PROCEDURE — 93005 ELECTROCARDIOGRAM TRACING: CPT

## 2019-11-12 PROCEDURE — 70450 CT HEAD/BRAIN W/O DYE: CPT

## 2019-11-12 PROCEDURE — 80048 BASIC METABOLIC PNL TOTAL CA: CPT

## 2019-11-12 PROCEDURE — 85730 THROMBOPLASTIN TIME PARTIAL: CPT

## 2019-11-12 PROCEDURE — 83036 HEMOGLOBIN GLYCOSYLATED A1C: CPT

## 2019-11-12 PROCEDURE — 71045 X-RAY EXAM CHEST 1 VIEW: CPT

## 2019-11-12 PROCEDURE — 85027 COMPLETE CBC AUTOMATED: CPT

## 2019-11-12 PROCEDURE — 94640 AIRWAY INHALATION TREATMENT: CPT

## 2019-11-12 PROCEDURE — 84484 ASSAY OF TROPONIN QUANT: CPT

## 2019-11-12 PROCEDURE — 82962 GLUCOSE BLOOD TEST: CPT

## 2019-11-12 PROCEDURE — 85610 PROTHROMBIN TIME: CPT

## 2019-11-12 PROCEDURE — 83735 ASSAY OF MAGNESIUM: CPT

## 2019-11-12 PROCEDURE — 74177 CT ABD & PELVIS W/CONTRAST: CPT

## 2019-11-12 PROCEDURE — 97110 THERAPEUTIC EXERCISES: CPT

## 2019-11-19 ENCOUNTER — APPOINTMENT (OUTPATIENT)
Dept: SURGICAL ONCOLOGY | Facility: AMBULATORY SURGERY CENTER | Age: 79
End: 2019-11-19

## 2019-12-30 NOTE — ED PROVIDER NOTE - MUSCULOSKELETAL, MLM
Spine appears normal, range of motion is not limited, no muscle or joint tenderness; normal passive ROM x 4 ext

## 2019-12-30 NOTE — PROVIDER CONTACT NOTE (OTHER) - BACKGROUND
80 yo M p/w fall at home 2x in last 24 hrs; pt lives alone fell out of bed last night, able to muscle himself back into bed; fell again this morning, used life alert; +head struck last night

## 2019-12-30 NOTE — ED PROVIDER NOTE - OBJECTIVE STATEMENT
78 yo male hx of htn, CAD, p/w fall at home twice in last 24 hrs; patient states he lives alone secondary to wife passing away a few months ago; was in rehab earlier this year for similar falls, and was home with walker that he admits to not using; fell out of bed last night and was able to muscle himself back into bed; but fell again this morning and used life alert to call for help; +head struck last night, on plavix; no LOC; denies fever or recent illness; states his legs "just got weak."

## 2019-12-30 NOTE — PHYSICAL THERAPY INITIAL EVALUATION ADULT - GAIT DEVIATIONS NOTED, PT EVAL
decreased kashif/decreased step length/footdrop right foot drag/decreased step length/decreased kashif

## 2019-12-30 NOTE — ED ADULT NURSE NOTE - NSIMPLEMENTINTERV_GEN_ALL_ED
Implemented All Fall Risk Interventions:  [place] to call system. Call bell, personal items and telephone within reach. Instruct patient to call for assistance. Room bathroom lighting operational. Non-slip footwear when patient is off stretcher. Physically safe environment: no spills, clutter or unnecessary equipment. Stretcher in lowest position, wheels locked, appropriate side rails in place. Provide visual cue, wrist band, yellow gown, etc. Monitor gait and stability. Monitor for mental status changes and reorient to person, place, and time. Review medications for side effects contributing to fall risk. Reinforce activity limits and safety measures with patient and family.

## 2019-12-30 NOTE — ED PROVIDER NOTE - NSFOLLOWUPINSTRUCTIONS_ED_ALL_ED_FT
Fall Prevention in the Home, Adult  Falls can cause injuries and can affect people from all age groups. There are many simple things that you can do to make your home safe and to help prevent falls. Ask for help when making these changes, if needed.  What actions can I take to prevent falls?  General instructions     Use good lighting in all rooms. Replace any light bulbs that burn out.Turn on lights if it is dark. Use night-lights.Place frequently used items in easy-to-reach places. Lower the shelves around your home if necessary.Set up furniture so that there are clear paths around it. Avoid moving your furniture around.Remove throw rugs and other tripping hazards from the floor.Avoid walking on wet floors.Fix any uneven floor surfaces.Add color or contrast paint or tape to grab bars and handrails in your home. Place contrasting color strips on the first and last steps of stairways.When you use a stepladder, make sure that it is completely opened and that the sides are firmly locked. Have someone hold the ladder while you are using it. Do not climb a closed stepladder.Be aware of any and all pets.What can I do in the bathroom?               Keep the floor dry. Immediately clean up any water that spills onto the floor.Remove soap buildup in the tub or shower on a regular basis.Use non-skid mats or decals on the floor of the tub or shower.Attach bath mats securely with double-sided, non-slip rug tape.If you need to sit down while you are in the shower, use a plastic, non-slip stool.Install grab bars by the toilet and in the tub and shower. Do not use towel bars as grab bars.What can I do in the bedroom?     Make sure that a bedside light is easy to reach.Do not use oversized bedding that drapes onto the floor.Have a firm chair that has side arms to use for getting dressed.What can I do in the kitchen?     Clean up any spills right away.If you need to reach for something above you, use a sturdy step stool that has a grab bar.Keep electrical cables out of the way.Do not use floor polish or wax that makes floors slippery. If you must use wax, make sure that it is non-skid floor wax.What can I do in the stairways?     Do not leave any items on the stairs.Make sure that you have a light switch at the top of the stairs and the bottom of the stairs. Have them installed if you do not have them.Make sure that there are handrails on both sides of the stairs. Fix handrails that are broken or loose. Make sure that handrails are as long as the stairways.Install non-slip stair treads on all stairs in your home.Avoid having throw rugs at the top or bottom of stairways, or secure the rugs with carpet tape to prevent them from moving.Choose a carpet design that does not hide the edge of steps on the stairway.Check any carpeting to make sure that it is firmly attached to the stairs. Fix any carpet that is loose or worn.What can I do on the outside of my home?     Use bright outdoor lighting.Regularly repair the edges of walkways and driveways and fix any cracks.Remove high doorway thresholds.Trim any shrubbery on the main path into your home.Regularly check that handrails are securely fastened and in good repair. Both sides of any steps should have handrails.Install guardrails along the edges of any raised decks or porches.Clear walkways of debris and clutter, including tools and rocks.Have leaves, snow, and ice cleared regularly.Use sand or salt on walkways during winter months.In the garage, clean up any spills right away, including grease or oil spills.What other actions can I take?     Wear closed-toe shoes that fit well and support your feet. Wear shoes that have rubber soles or low heels.Use mobility aids as needed, such as canes, walkers, scooters, and crutches.Review your medicines with your health care provider. Some medicines can cause dizziness or changes in blood pressure, which increase your risk of falling.Talk with your health care provider about other ways that you can decrease your risk of falls. This may include working with a physical therapist or  to improve your strength, balance, and endurance.  Where to find more information  Centers for Disease Control and PreventionRONY: https://www.cdc.govNational [place] on Aging: https://wk1vsck.justin.nih.govContact a health care provider if:  You are afraid of falling at home.You feel weak, drowsy, or dizzy at home.You fall at home.Summary  There are many simple things that you can do to make your home safe and to help prevent falls.Ways to make your home safe include removing tripping hazards and installing grab bars in the bathroom.Ask for help when making these changes in your home.This information is not intended to replace advice given to you by your health care provider. Make sure you discuss any questions you have with your health care provider.

## 2019-12-30 NOTE — ED ADULT NURSE NOTE - OBJECTIVE STATEMENT
Pt arrives to ED via ambulance and states last night he felt weak in both legs and this morning when pt tried to get out of bed he was weak and unable to stand and support his weight and fell. Pt states he fell backwards and hit his back, also states may have hit back of head a little too. Pt states he fell last night too but was able to struggle and get back up and into bed. No obvious signs of trauma noted to back or head. Pt states he takes Plavix 1x a day.  denies passing out/syncope.  Pt A & Ox4. waiting for MD evaluation.

## 2019-12-30 NOTE — ED PROVIDER NOTE - PATIENT PORTAL LINK FT
You can access the FollowMyHealth Patient Portal offered by [facility] by registering at the following website: [email or web address]. By joining [facility]’s FollowMyHealth portal, you will also be able to view your health information using other applications (apps) compatible with our system.

## 2019-12-30 NOTE — PHYSICAL THERAPY INITIAL EVALUATION ADULT - PERTINENT HX OF CURRENT PROBLEM, REHAB EVAL
80 yo male hx of htn, CAD, p/w fall at home twice in last 24 hrs; patient states he lives alone secondary to wife passing away a few months ago; was in rehab earlier this year for similar falls, and was home with walker that he admits to not using; fell out of bed last night and was able to muscle himself back into bed; but fell again this morning and used life alert to call for help; +head struck last night

## 2019-12-30 NOTE — ED ADULT TRIAGE NOTE - CHIEF COMPLAINT QUOTE
Pt brought in by ambulance from home s/p fall and pressing his life alert. Pt states that he fell yesterday and skinned his right knee. Pt denies head injury or trauma. +bruising to right head. Pt denies LOC. Pt on plavix

## 2019-12-30 NOTE — ED PROVIDER NOTE - CLINICAL SUMMARY MEDICAL DECISION MAKING FREE TEXT BOX
head ct and labs unremarkable for acute pathology; did well with PT and walked easily with me; eager for and requesting d/c, daughter at bedside, recommend outpt PT; urine was sent, will f/u cultures

## 2019-12-30 NOTE — ED PROVIDER NOTE - ENMT, MLM
Airway patent, Nasal mucosa clear. Mouth with normal mucosa.   Head with small contusion to right frontal scalp

## 2019-12-30 NOTE — PHYSICAL THERAPY INITIAL EVALUATION ADULT - ADDITIONAL COMMENTS
Pt reports he was independent in all ADL prior to admission. Pt lives alone in apartment with no NIK or inside. Pt denies using AD prior to admission but owns cane and walker. Pt states he still drives. Per pt daughter lives close by and can help.

## 2019-12-30 NOTE — PROVIDER CONTACT NOTE (OTHER) - ASSESSMENT
Chart reviewed, contents noted, orders received. PT eval performed. Okay to see pt per MIS Christine. Pt received supine in bed, all lines intact, NAD. Pt is independent in bed mobility, transfers sit to stand, and ambulation with RW 20 ft. Pt reports 0/10 pain before treatment, 0/10 during treatment and 0/10 after treatment. Pt left with CBWR in NAD on stretcher supine.  RN Aware. PT will not follow, pt is at his baseline. Chart reviewed, contents noted, orders received. PT eval performed. Okay to see pt per MIS Christine. Pt received supine in bed, all lines intact, NAD. Pt is independent in bed mobility, transfers sit to stand, and ambulation with RW 50 ft. Pt had right foot drag when ambulating without RW, Dr Ronan rodriguez and MIS Christine. Pt reports 0/10 pain before treatment, 0/10 during treatment and 0/10 after treatment. Pt left with CBWR in NAD on stretcher supine.  RN Aware. PT will not follow, pt is at his baseline.

## 2019-12-30 NOTE — PHYSICAL THERAPY INITIAL EVALUATION ADULT - LEVEL OF INDEPENDENCE: GAIT, REHAB EVAL
Pt ambulated 20 ft in total, 10 ft with RW and 10 without RW including turns without loss of balance. PT noted slight right foot drop without loss of balance. unable to perform/Pt ambulated 50 ft in total, 20 ft with RW and 30 without RW including turns without loss of balance. PT noted intermittent right foot drag without loss of balance when ambulating w/o RW.

## 2020-01-01 ENCOUNTER — RX RENEWAL (OUTPATIENT)
Age: 80
End: 2020-01-01

## 2020-01-01 ENCOUNTER — TRANSCRIPTION ENCOUNTER (OUTPATIENT)
Age: 80
End: 2020-01-01

## 2020-01-01 ENCOUNTER — APPOINTMENT (OUTPATIENT)
Dept: NEUROLOGY | Facility: CLINIC | Age: 80
End: 2020-01-01
Payer: MEDICARE

## 2020-01-01 ENCOUNTER — OUTPATIENT (OUTPATIENT)
Dept: OUTPATIENT SERVICES | Facility: HOSPITAL | Age: 80
LOS: 1 days | Discharge: HOME | End: 2020-01-01
Payer: MEDICARE

## 2020-01-01 ENCOUNTER — LABORATORY RESULT (OUTPATIENT)
Age: 80
End: 2020-01-01

## 2020-01-01 ENCOUNTER — RESULT REVIEW (OUTPATIENT)
Age: 80
End: 2020-01-01

## 2020-01-01 ENCOUNTER — RX CHANGE (OUTPATIENT)
Age: 80
End: 2020-01-01

## 2020-01-01 ENCOUNTER — EMERGENCY (EMERGENCY)
Facility: HOSPITAL | Age: 80
LOS: 1 days | Discharge: HOME | End: 2020-01-01
Attending: EMERGENCY MEDICINE
Payer: MEDICARE

## 2020-01-01 ENCOUNTER — APPOINTMENT (OUTPATIENT)
Dept: MRI IMAGING | Facility: CLINIC | Age: 80
End: 2020-01-01
Payer: MEDICARE

## 2020-01-01 ENCOUNTER — NON-APPOINTMENT (OUTPATIENT)
Age: 80
End: 2020-01-01

## 2020-01-01 ENCOUNTER — APPOINTMENT (OUTPATIENT)
Dept: SURGICAL ONCOLOGY | Facility: AMBULATORY SURGERY CENTER | Age: 80
End: 2020-01-01

## 2020-01-01 ENCOUNTER — OUTPATIENT (OUTPATIENT)
Dept: OUTPATIENT SERVICES | Facility: HOSPITAL | Age: 80
LOS: 1 days | End: 2020-01-01

## 2020-01-01 VITALS
RESPIRATION RATE: 18 BRPM | HEART RATE: 61 BPM | HEIGHT: 69 IN | SYSTOLIC BLOOD PRESSURE: 186 MMHG | TEMPERATURE: 97 F | DIASTOLIC BLOOD PRESSURE: 96 MMHG

## 2020-01-01 VITALS
SYSTOLIC BLOOD PRESSURE: 141 MMHG | TEMPERATURE: 98 F | OXYGEN SATURATION: 96 % | RESPIRATION RATE: 15 BRPM | HEART RATE: 72 BPM | DIASTOLIC BLOOD PRESSURE: 71 MMHG

## 2020-01-01 VITALS
OXYGEN SATURATION: 96 % | RESPIRATION RATE: 16 BRPM | DIASTOLIC BLOOD PRESSURE: 80 MMHG | HEIGHT: 69 IN | SYSTOLIC BLOOD PRESSURE: 130 MMHG | WEIGHT: 207.01 LBS | HEART RATE: 60 BPM | TEMPERATURE: 98 F

## 2020-01-01 VITALS — BODY MASS INDEX: 28.88 KG/M2 | HEIGHT: 69 IN | TEMPERATURE: 98.2 F | WEIGHT: 195 LBS

## 2020-01-01 VITALS
RESPIRATION RATE: 16 BRPM | HEART RATE: 74 BPM | OXYGEN SATURATION: 96 % | DIASTOLIC BLOOD PRESSURE: 86 MMHG | TEMPERATURE: 98 F | HEIGHT: 69 IN | SYSTOLIC BLOOD PRESSURE: 179 MMHG | WEIGHT: 207.01 LBS

## 2020-01-01 VITALS
HEIGHT: 69 IN | SYSTOLIC BLOOD PRESSURE: 184 MMHG | WEIGHT: 195 LBS | DIASTOLIC BLOOD PRESSURE: 82 MMHG | BODY MASS INDEX: 28.88 KG/M2

## 2020-01-01 DIAGNOSIS — Z86.79 PERSONAL HISTORY OF OTHER DISEASES OF THE CIRCULATORY SYSTEM: ICD-10-CM

## 2020-01-01 DIAGNOSIS — Z86.39 PERSONAL HISTORY OF OTHER ENDOCRINE, NUTRITIONAL AND METABOLIC DISEASE: ICD-10-CM

## 2020-01-01 DIAGNOSIS — Z86.59 PERSONAL HISTORY OF OTHER MENTAL AND BEHAVIORAL DISORDERS: ICD-10-CM

## 2020-01-01 DIAGNOSIS — E11.40 TYPE 2 DIABETES MELLITUS WITH DIABETIC NEUROPATHY, UNSPECIFIED: ICD-10-CM

## 2020-01-01 DIAGNOSIS — L98.9 DISORDER OF THE SKIN AND SUBCUTANEOUS TISSUE, UNSPECIFIED: ICD-10-CM

## 2020-01-01 DIAGNOSIS — Z90.49 ACQUIRED ABSENCE OF OTHER SPECIFIED PARTS OF DIGESTIVE TRACT: Chronic | ICD-10-CM

## 2020-01-01 DIAGNOSIS — F02.80 ALZHEIMER'S DISEASE, UNSPECIFIED: ICD-10-CM

## 2020-01-01 DIAGNOSIS — Z98.890 OTHER SPECIFIED POSTPROCEDURAL STATES: Chronic | ICD-10-CM

## 2020-01-01 DIAGNOSIS — G30.9 ALZHEIMER'S DISEASE, UNSPECIFIED: ICD-10-CM

## 2020-01-01 DIAGNOSIS — Z01.818 ENCOUNTER FOR OTHER PREPROCEDURAL EXAMINATION: ICD-10-CM

## 2020-01-01 DIAGNOSIS — Z80.9 FAMILY HISTORY OF MALIGNANT NEOPLASM, UNSPECIFIED: ICD-10-CM

## 2020-01-01 DIAGNOSIS — Z82.0 FAMILY HISTORY OF EPILEPSY AND OTHER DISEASES OF THE NERVOUS SYSTEM: ICD-10-CM

## 2020-01-01 DIAGNOSIS — Z87.891 PERSONAL HISTORY OF NICOTINE DEPENDENCE: ICD-10-CM

## 2020-01-01 LAB
ALBUMIN SERPL ELPH-MCNC: 3.1 G/DL — LOW (ref 3.3–5.2)
ALP SERPL-CCNC: 106 U/L — SIGNIFICANT CHANGE UP (ref 40–120)
ALT FLD-CCNC: 8 U/L — SIGNIFICANT CHANGE UP
ANION GAP SERPL CALC-SCNC: 13 MMOL/L — SIGNIFICANT CHANGE UP (ref 5–17)
AST SERPL-CCNC: 26 U/L — SIGNIFICANT CHANGE UP
BASOPHILS # BLD AUTO: 0.1 K/UL — SIGNIFICANT CHANGE UP (ref 0–0.2)
BASOPHILS NFR BLD AUTO: 1 % — SIGNIFICANT CHANGE UP (ref 0–2)
BILIRUB SERPL-MCNC: 0.7 MG/DL — SIGNIFICANT CHANGE UP (ref 0.4–2)
BUN SERPL-MCNC: 14 MG/DL — SIGNIFICANT CHANGE UP (ref 8–20)
CALCIUM SERPL-MCNC: 8.2 MG/DL — LOW (ref 8.6–10.2)
CHLORIDE SERPL-SCNC: 101 MMOL/L — SIGNIFICANT CHANGE UP (ref 98–107)
CO2 SERPL-SCNC: 24 MMOL/L — SIGNIFICANT CHANGE UP (ref 22–29)
CREAT SERPL-MCNC: 0.76 MG/DL — SIGNIFICANT CHANGE UP (ref 0.5–1.3)
EOSINOPHIL # BLD AUTO: 0.28 K/UL — SIGNIFICANT CHANGE UP (ref 0–0.5)
EOSINOPHIL NFR BLD AUTO: 2.9 % — SIGNIFICANT CHANGE UP (ref 0–6)
FOLATE SERPL-MCNC: 8.6 NG/ML
GLUCOSE BLDC GLUCOMTR-MCNC: 171 MG/DL — HIGH (ref 70–99)
GLUCOSE SERPL-MCNC: 206 MG/DL — HIGH (ref 70–115)
HCT VFR BLD CALC: 43 % — SIGNIFICANT CHANGE UP (ref 39–50)
HGB BLD-MCNC: 14.1 G/DL — SIGNIFICANT CHANGE UP (ref 13–17)
IMM GRANULOCYTES NFR BLD AUTO: 0.4 % — SIGNIFICANT CHANGE UP (ref 0–1.5)
LYMPHOCYTES # BLD AUTO: 1.51 K/UL — SIGNIFICANT CHANGE UP (ref 1–3.3)
LYMPHOCYTES # BLD AUTO: 15.8 % — SIGNIFICANT CHANGE UP (ref 13–44)
MCHC RBC-ENTMCNC: 29.4 PG — SIGNIFICANT CHANGE UP (ref 27–34)
MCHC RBC-ENTMCNC: 32.8 GM/DL — SIGNIFICANT CHANGE UP (ref 32–36)
MCV RBC AUTO: 89.8 FL — SIGNIFICANT CHANGE UP (ref 80–100)
METHYLMALONATE SERPL-SCNC: 1142 NMOL/L
MONOCYTES # BLD AUTO: 0.74 K/UL — SIGNIFICANT CHANGE UP (ref 0–0.9)
MONOCYTES NFR BLD AUTO: 7.7 % — SIGNIFICANT CHANGE UP (ref 2–14)
NEUTROPHILS # BLD AUTO: 6.91 K/UL — SIGNIFICANT CHANGE UP (ref 1.8–7.4)
NEUTROPHILS NFR BLD AUTO: 72.2 % — SIGNIFICANT CHANGE UP (ref 43–77)
PLATELET # BLD AUTO: 206 K/UL — SIGNIFICANT CHANGE UP (ref 150–400)
POTASSIUM SERPL-MCNC: 4.2 MMOL/L — SIGNIFICANT CHANGE UP (ref 3.5–5.3)
POTASSIUM SERPL-SCNC: 4.2 MMOL/L — SIGNIFICANT CHANGE UP (ref 3.5–5.3)
PROT SERPL-MCNC: 6.5 G/DL — LOW (ref 6.6–8.7)
RBC # BLD: 4.79 M/UL — SIGNIFICANT CHANGE UP (ref 4.2–5.8)
RBC # FLD: 14.9 % — HIGH (ref 10.3–14.5)
SODIUM SERPL-SCNC: 138 MMOL/L — SIGNIFICANT CHANGE UP (ref 135–145)
SURGICAL PATHOLOGY STUDY: SIGNIFICANT CHANGE UP
T3RU NFR SERPL: 0.9 TBI
T4 SERPL-MCNC: 5.9 UG/DL
TSH SERPL-ACNC: 2.67 UIU/ML
VIT B12 SERPL-MCNC: 238 PG/ML
WBC # BLD: 9.58 K/UL — SIGNIFICANT CHANGE UP (ref 3.8–10.5)
WBC # FLD AUTO: 9.58 K/UL — SIGNIFICANT CHANGE UP (ref 3.8–10.5)

## 2020-01-01 PROCEDURE — 99284 EMERGENCY DEPT VISIT MOD MDM: CPT

## 2020-01-01 PROCEDURE — 11624 EXC S/N/H/F/G MAL+MRG 3.1-4: CPT

## 2020-01-01 PROCEDURE — 88341 IMHCHEM/IMCYTCHM EA ADD ANTB: CPT | Mod: 26

## 2020-01-01 PROCEDURE — 70450 CT HEAD/BRAIN W/O DYE: CPT

## 2020-01-01 PROCEDURE — 88342 IMHCHEM/IMCYTCHM 1ST ANTB: CPT | Mod: 26

## 2020-01-01 PROCEDURE — 93040 RHYTHM ECG WITH REPORT: CPT

## 2020-01-01 PROCEDURE — 85027 COMPLETE CBC AUTOMATED: CPT

## 2020-01-01 PROCEDURE — 99204 OFFICE O/P NEW MOD 45 MIN: CPT

## 2020-01-01 PROCEDURE — 99214 OFFICE O/P EST MOD 30 MIN: CPT

## 2020-01-01 PROCEDURE — 80053 COMPREHEN METABOLIC PANEL: CPT

## 2020-01-01 PROCEDURE — 95886 MUSC TEST DONE W/N TEST COMP: CPT

## 2020-01-01 PROCEDURE — 95909 NRV CNDJ TST 5-6 STUDIES: CPT

## 2020-01-01 PROCEDURE — 95819 EEG AWAKE AND ASLEEP: CPT

## 2020-01-01 PROCEDURE — 36415 COLL VENOUS BLD VENIPUNCTURE: CPT

## 2020-01-01 PROCEDURE — 99284 EMERGENCY DEPT VISIT MOD MDM: CPT | Mod: 25

## 2020-01-01 PROCEDURE — 70551 MRI BRAIN STEM W/O DYE: CPT | Mod: 26

## 2020-01-01 PROCEDURE — 70450 CT HEAD/BRAIN W/O DYE: CPT | Mod: 26

## 2020-01-01 PROCEDURE — 88305 TISSUE EXAM BY PATHOLOGIST: CPT | Mod: 26

## 2020-01-01 RX ORDER — INSULIN GLARGINE 100 [IU]/ML
20 INJECTION, SOLUTION SUBCUTANEOUS
Qty: 0 | Refills: 0 | DISCHARGE

## 2020-01-01 RX ORDER — INSULIN GLARGINE 100 [IU]/ML
80 INJECTION, SOLUTION SUBCUTANEOUS
Qty: 0 | Refills: 0 | DISCHARGE

## 2020-01-01 RX ORDER — INSULIN GLARGINE 300 U/ML
INJECTION, SOLUTION SUBCUTANEOUS
Refills: 0 | Status: ACTIVE | COMMUNITY

## 2020-01-01 RX ORDER — CLOPIDOGREL BISULFATE 75 MG/1
75 TABLET, FILM COATED ORAL
Refills: 0 | Status: COMPLETED | COMMUNITY

## 2020-01-01 RX ORDER — ATORVASTATIN CALCIUM 80 MG/1
TABLET, FILM COATED ORAL
Refills: 0 | Status: ACTIVE | COMMUNITY

## 2020-01-01 RX ORDER — INSULIN LISPRO 100/ML
4 VIAL (ML) SUBCUTANEOUS
Qty: 0 | Refills: 0 | DISCHARGE

## 2020-01-01 RX ORDER — METOPROLOL SUCCINATE 100 MG/1
TABLET, EXTENDED RELEASE ORAL
Refills: 0 | Status: COMPLETED | COMMUNITY

## 2020-01-01 RX ORDER — INSULIN GLARGINE 100 [IU]/ML
INJECTION, SOLUTION SUBCUTANEOUS
Refills: 0 | Status: COMPLETED | COMMUNITY

## 2020-01-01 RX ORDER — SERTRALINE HYDROCHLORIDE 100 MG/1
100 TABLET, FILM COATED ORAL
Refills: 0 | Status: COMPLETED | COMMUNITY

## 2020-01-01 RX ORDER — INSULIN LISPRO 100 [IU]/ML
100 INJECTION, SOLUTION SUBCUTANEOUS
Refills: 0 | Status: DISCONTINUED | COMMUNITY
End: 2020-01-01

## 2020-01-01 RX ORDER — ATORVASTATIN CALCIUM 80 MG/1
TABLET, FILM COATED ORAL
Refills: 0 | Status: COMPLETED | COMMUNITY

## 2020-01-01 RX ORDER — METOPROLOL TARTRATE 75 MG/1
TABLET, FILM COATED ORAL
Refills: 0 | Status: ACTIVE | COMMUNITY

## 2020-01-01 RX ORDER — CEPHALEXIN 500 MG
1 CAPSULE ORAL
Qty: 21 | Refills: 0
Start: 2020-01-01 | End: 2020-01-01

## 2020-01-01 RX ORDER — OMEGA-3/DHA/EPA/FISH OIL 35-113.5MG
1000 TABLET,CHEWABLE ORAL
Refills: 0 | Status: ACTIVE | COMMUNITY

## 2020-01-01 RX ORDER — RIVAROXABAN 20 MG/1
20 TABLET, FILM COATED ORAL
Refills: 0 | Status: COMPLETED | COMMUNITY

## 2020-01-01 RX ORDER — SERTRALINE 25 MG/1
TABLET, FILM COATED ORAL
Refills: 0 | Status: ACTIVE | COMMUNITY

## 2020-01-01 RX ORDER — METFORMIN HYDROCHLORIDE 625 MG/1
TABLET ORAL
Refills: 0 | Status: ACTIVE | COMMUNITY

## 2020-01-01 RX ORDER — IBUPROFEN 200 MG
1 TABLET ORAL
Qty: 0 | Refills: 0 | DISCHARGE

## 2020-01-01 RX ORDER — LOSARTAN POTASSIUM 100 MG/1
TABLET, FILM COATED ORAL
Refills: 0 | Status: ACTIVE | COMMUNITY

## 2020-01-01 RX ORDER — CLOPIDOGREL BISULFATE 75 MG/1
75 TABLET, FILM COATED ORAL
Refills: 0 | Status: ACTIVE | COMMUNITY

## 2020-01-01 RX ORDER — RIVAROXABAN 2.5 MG/1
TABLET, FILM COATED ORAL
Refills: 0 | Status: ACTIVE | COMMUNITY

## 2020-01-01 RX ORDER — MEMANTINE HYDROCHLORIDE 10 MG/1
10 TABLET, FILM COATED ORAL TWICE DAILY
Qty: 180 | Refills: 1 | Status: ACTIVE | COMMUNITY
Start: 2020-01-01 | End: 1900-01-01

## 2020-01-01 RX ORDER — CEPHALEXIN 500 MG
500 CAPSULE ORAL ONCE
Refills: 0 | Status: COMPLETED | OUTPATIENT
Start: 2020-01-01 | End: 2020-01-01

## 2020-01-01 RX ORDER — ACETAMINOPHEN 500 MG
1 TABLET ORAL
Qty: 0 | Refills: 0 | DISCHARGE

## 2020-01-01 RX ORDER — INSULIN LISPRO 100/ML
5 VIAL (ML) SUBCUTANEOUS
Qty: 0 | Refills: 0 | DISCHARGE

## 2020-01-01 RX ORDER — LOSARTAN POTASSIUM 100 MG/1
1 TABLET, FILM COATED ORAL
Qty: 0 | Refills: 0 | DISCHARGE

## 2020-01-01 RX ORDER — LOSARTAN POTASSIUM 50 MG/1
50 TABLET, FILM COATED ORAL
Refills: 0 | Status: COMPLETED | COMMUNITY

## 2020-01-01 RX ADMIN — Medication 500 MILLIGRAM(S): at 21:11

## 2020-01-06 NOTE — H&P PST ADULT - ATTENDING COMMENTS
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 Casas.    Diagnosis and plan of management were reviewed with him and his daughter in my office, and again the morning of operation.     All questions answered, consent on chart

## 2020-01-06 NOTE — H&P PST ADULT - MUSCULOSKELETAL
details… detailed exam ROM intact/no joint erythema/normal strength/no calf tenderness/no joint swelling/no joint warmth

## 2020-01-06 NOTE — H&P PST ADULT - NSICDXPASTSURGICALHX_GEN_ALL_CORE_FT
PAST SURGICAL HISTORY:  S/P appendectomy 1952    S/P CABG (coronary artery bypass graft)     S/P cardiac cath no stents placed per pt    S/P cholecystectomy

## 2020-01-06 NOTE — H&P PST ADULT - NSICDXPROBLEM_GEN_ALL_CORE_FT
PROBLEM DIAGNOSES  Problem: Disorder of skin and subcutaneous tissue  Assessment and Plan: Pt scheduled for surgery on 1/16/20.  Pre-op instructions provided. Pt verbalized understanding.   Pepcid provided for GI prophylaxis.   Spoke to pt's daughter Michelle who states pt was instructed to continue all medications including bloodthinners preop. Called and spoke with surgical coordinator Ismael who confirmed that per surgeon pt can remain on Plavix and Xarelto preop.   Pt is going for medical clearance per surgeon's request - requested by PST as well (due to pt being poor historian, recent falls, CAD). Also requested last cardiac note (pt states he saw cardiologist 2 months ago)    Copy of last echo in chart.   Pt instructed to take his Metoprolol and losartan the morning of surgery.   Pt instructed to hold novolog the  morning of surgery and take only 64 units of lantus the night before surgery.   OR booking notified of DM and AMY precautions.

## 2020-01-06 NOTE — H&P PST ADULT - HISTORY OF PRESENT ILLNESS
79 year old male with hx of scalp lesion which he states has recently increased in size. Pt had biopsy done which pt states was positive for malignancy. Pt presents today for presurgical evaluation for ....    Pt with recently evaluation at [facility] ER for falls. Had head CT with no acute changes and was discharged.     Pt is poor historian. 79 year old male with hx of scalp lesion which he states has recently increased in size. Pt had biopsy done which pt states was positive for malignancy. Pt presents today for presurgical evaluation for Wide Excision Left Scalp Neoplasm, Excision of Left Scalp Neoplasm with Complex Closure, Possible Skin Graft and Possible Adjacent Tissue Rearrangement scheduled on 1/16/2020.     Pt with recent evaluation at [facility] ER for falls. Had head CT with no acute changes and was discharged.     Pt is poor historian.

## 2020-01-06 NOTE — H&P PST ADULT - NSICDXPASTMEDICALHX_GEN_ALL_CORE_FT
PAST MEDICAL HISTORY:  Anxiety     CAD (coronary artery disease)     Cholecystitis chronic, acute     Depression     Diabetes mellitus     DVT (deep venous thrombosis) right leg 8 yrs ago    Hx of smoking     Hyperlipidemia     Hypertension     Obesity (BMI 30-39.9)     PVD (peripheral vascular disease)

## 2020-01-16 NOTE — BRIEF OPERATIVE NOTE - OPERATION/FINDINGS
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

## 2020-01-16 NOTE — BRIEF OPERATIVE NOTE - NSICDXBRIEFPROCEDURE_GEN_ALL_CORE_FT
PROCEDURES:  Wide excision of melanoma with split thickness skin graft (STSG) of extremity 16-Jan-2020 10:16:42 Scalp melanoma resection, full thickness skin graft from [facility]

## 2020-01-16 NOTE — ASU PREOPERATIVE ASSESSMENT, ADULT (IPARK ONLY) - FALL HARM RISK CONCLUSION
Your A1c:    Hemoglobin A1C   Date Value Ref Range Status   07/07/2016 7.0 (H) 4.5 - 6.2 % Final     Comment:     According to ADA guidelines, hemoglobin A1C <7.0% represents  optimal control in non-pregnant diabetic patients.  Different  metrics may apply to specific populations.   Standards of Medical Care in Diabetes - 2016.  For the purpose of screening for the presence of diabetes:  <5.7%     Consistent with the absence of diabetes  5.7-6.4%  Consistent with increasing risk for diabetes   (prediabetes)  >or=6.5%  Consistent with diabetes  Currently no consensus exists for use of hemoglobin A1C  for diagnosis of diabetes for children.     05/28/2016 7.0 (H) 4.5 - 6.2 % Final   01/12/2016 7.1 (H) 4.5 - 6.2 % Final       How to Check Your Feet    Below are tips to help you look for foot problems. Try to check your feet at the same time each day, such as when you get out of bed in the morning.    · Check the top of each foot. The tops of toes, back of the heel, and outer edge of the foot can get a lot of rubbing from poor-fitting shoes.    · Check the bottom of each foot. Daily wear and tear often leads to problems at pressure spots.    · Check the toes and nails. Fungal infections often occur between toes. Toenail problems can also be a sign of fungal infections or lead to breaks in the skin.    · Check your shoes, too. Loose objects inside a shoe can injure the foot. Use your hand to feel inside your shoes for things like yeison, loose stitching, or rough areas that could irritate your skin.        Diabetic Foot Care    Diabetes can lead to a number of different foot complications. Fortunately, most of these complications can be prevented with a little extra foot care. If diabetes is not well controlled, the high blood sugar can cause damage to blood vessels and result in poor circulation to the foot. When the skin does not get enough blood flow, it becomes prone to pressure sores and ulcers, which heal  slowly.  High blood sugar can also damage nerves, interfering with the ability to feel pain and pressure. When you cant feel your foot normally, it is easy to injure your skin, bones and joints without knowing it. For these reasons diabetes increases the risk of fungal infections, bunions and ulcers. Deep ulcers can lead to bone infection. Gangrene is the most serious foot complication of diabetes. It usually occurs on the tips of the toes as blacked areas of skin. The black area is dead tissue. In severe cases, gangrene spreads to involve the entire toe, other toes and the entire foot. Foot or toe amputation may be required. Good foot care and blood sugar control can prevent this.    Home Care  1. Wear comfortable, proper fitting shoes.  2. Wash your feet daily with warm water and mild soap.  3. After drying, apply a moisturizing cream or lotion.  4. Check your feet daily for skin breaks, blisters, swelling, or redness. Look between your toes also.  5. Wear cotton socks and change them every day.  6. Trim toe nails carefully and do not cut your cuticles.  7. Strive to keep your blood sugar under control with a combination of medicines, diet and activity.  8. If you smoke and have diabetes, it is very important that you stop. Smoking reduces blood flow to your foot.  9. Avoid activities that increase your risk of foot injury:  · Do not walk barefoot.  · Do not use heating pads or hot water bottles on your feet.  · Do not put your foot in a hot tub without first checking the temperature with your hand.  10) Schedule yearly foot exams.    Follow Up  with your doctor or as advised by our staff. Report any cut, puncture, scrape, other injury, blister, ingrown toenail or ulcer on your foot.    Get Prompt Medical Attention  if any of the following occur:  -- Open ulcer with pus draining from the wound  -- Increasing foot or leg pain  -- New areas of redness or swelling or tender areas of the foot    © 4622-9872 [employer]. [address] [place] [address], [place] [address]. All rights reserved. This information is not intended as a substitute for professional medical care. Always follow your healthcare professional's instructions.       Fall with Harm Risk

## 2020-01-16 NOTE — ASU DISCHARGE PLAN (ADULT/PEDIATRIC) - ASU DC SPECIAL INSTRUCTIONSFT
Initial followup with plastic surgery with  15 days.    Dr. Yañez should call with pathology report in approximately 2 weeks, the conversation will determine further management Initial followup with plastic surgery in 2 weeks.    Dr. Yañez should call with pathology report in approximately 2 weeks, the conversation will determine further management

## 2020-01-16 NOTE — ASU DISCHARGE PLAN (ADULT/PEDIATRIC) - CALL YOUR DOCTOR IF YOU HAVE ANY OF THE FOLLOWING:
Wound/Surgical Site with redness, or foul smelling discharge or pus/Unable to urinate/Bleeding that does not stop/Increased irritability or sluggishness/Numbness, tingling, color or temperature change to extremity/Excessive diarrhea/Inability to tolerate liquids or foods/Swelling that gets worse/Pain not relieved by Medications/Nausea and vomiting that does not stop

## 2020-01-16 NOTE — ASU DISCHARGE PLAN (ADULT/PEDIATRIC) - CARE PROVIDER_API CALL
Hortencia Saunders)  Plastic Surgery Surgery  [address] [place] [address]  Phone: [phone]  Fax: [phone]  Follow Up Time: 2 weeks

## 2020-01-19 NOTE — ED PROVIDER NOTE - PMH
Anxiety    CAD (coronary artery disease)    Cholecystitis chronic, acute    Depression    Diabetes mellitus    DVT (deep venous thrombosis)  right leg 8 yrs ago  Hx of smoking    Hyperlipidemia    Hypertension    Obesity (BMI 30-39.9)    PVD (peripheral vascular disease)

## 2020-01-19 NOTE — ED ADULT NURSE NOTE - OBJECTIVE STATEMENT
Pt. present to ED with swelling and ecchymosis to b/l eyelid(Left greater than right) that he noticed this morning.  s/p skin cancer(low grade malignant cancer) removal on 1/16. Dr. Layne performed the resection and Dr. Sauceda did the skin graft. Pt. was discharged home that same day. Pt. denies noticing any discharge from his eye this morning. NO pruritus. No discharge from Pt. held his xarelto and Plavix prior to his surgery and restarted his medication after his surgery.

## 2020-01-19 NOTE — ED PROVIDER NOTE - PATIENT PORTAL LINK FT
You can access the FollowMyHealth Patient Portal offered by [facility] by registering at the following website: [email or web address]. By joining [employer]’s FollowMyHealth portal, you will also be able to view your health information using other applications (apps) compatible with our system.

## 2020-01-19 NOTE — ED PROVIDER NOTE - PHYSICAL EXAMINATION
Head- Left frontal scalp Wound that is covered. Mild swelling noted to scalp. +Swelling palpated to forehead. +Ecchymosis noted to b/l eyelids(Left eyelid)

## 2020-01-19 NOTE — ED PROVIDER NOTE - OBJECTIVE STATEMENT
Pt. present to ED with swelling and ecchymosis to b/l eyelid(Left greater than right) that he noticed this morning.  s/p skin cancer(low grade malignant cancer) removal on 1/16. Pt. was discharged home that same day. Pt. held his xarelto and Plavix prior to his surgery and restarted his medication after his surgery. Pt. present to ED with swelling and ecchymosis to b/l eyelid(Left greater than right) that he noticed this morning.  s/p skin cancer(low grade malignant cancer) removal on 1/16. Dr. Layne performed the resection and Dr. Sauceda did the skin graft. Pt. was discharged home that same day. Pt. denies noticing any discharge from his eye this morning. NO pruritus. No discharge from Pt. held his xarelto and Plavix prior to his surgery and restarted his medication after his surgery. Pt. states that he feels like the swelling to his eyelid has improved since this morning. Pt. able to open his left eye much more.

## 2020-01-19 NOTE — ED PROVIDER NOTE - PROGRESS NOTE DETAILS
Infectious Disease Spoke to the on-call surgeon who stated that the plastics surgeon should be called since pt. might be having the swelling due to the skin graft that was placed. Cased discussed with Dr. Navarro who suggest that pt. be started on Keflex and to follow up in the office in 24-48 hours. This was discussed with the daughter. Swelling continues to decrease. CT scan results discussed with patient and his daughter. Pt. stable for discharge. Ice pack given to patient. Pt. informed to keep head elevated and to use ice pack for swelling.

## 2020-01-19 NOTE — ED PROVIDER NOTE - ADDITIONAL NOTES AND INSTRUCTIONS:
Follow up with your plastic surgeon in 24-48 hours. Keep head elevated. Use Ice pack to keep swelling down. Take your antibiotics as prescribed.

## 2020-01-19 NOTE — ED ADULT TRIAGE NOTE - CHIEF COMPLAINT QUOTE
pt a+ox3, BIBA from home c/o left eye swelling. left eye swollen shut and eyelid red. denies any change in vision.

## 2020-01-19 NOTE — ED PROVIDER NOTE - CLINICAL SUMMARY MEDICAL DECISION MAKING FREE TEXT BOX
Pt. with swelling and ecchymosis to forehead and eyelids. Possible post op complication. Will contact pt's surgeon and check labs and head CT.

## 2020-02-26 NOTE — HISTORY OF PRESENT ILLNESS
[de-identified] : 78-year-old man referred by his dermatologist Dr. 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.\par \par This was an asymptomatic growth on the left frontal scalp that his wife first noticed ~2017; unfortunately, she recently passed.\par Persistence, elected dermatologic evaluation, biopsy, and the above diagnosis.\par \par No prior personal history of cutaneous malignancy.\par \par No personal history of malignancy.\par \par No relatives with skin cancer.\par \par His mother had breast cancer.\par A sister had ovarian cancer.\par A brother had bone cancer.\par Several cousins, and other second-degree relatives had other malignancies.\par \par \par July 2019 he was hospitalized at [facility] with an episode of weakness and fatigue found to be due to bradycardia, related with an underlying rhythm of atrial flutter/fibrillation.\par Discharged in satisfactory condition\par Has followup with cardiology later today (Dr. sEa ZAMAN)\par \par \par PMD: Dr Benedict RIZO\par \par No pacemaker or defibrillator\par \par +h/o of CAD, S/P CABG on PLAVIX\par \par +HTN: Losartan & HCTZ\par Atorvastatin for hyperlipidemia\par \par +DIABETES, rx'd with Lantus - No endocrinologist, Medications regulated by his primary care physician\par \par He has not had a colonoscopy since ~age 66

## 2020-02-26 NOTE — ASSESSMENT
[FreeTextEntry1] : [facility]\par Cardiomegaly with prominent vascular and interstitial markings.\par \par I reviewed the current diagnosis of a spindle cell neoplasm of the left frontal scalp, and the need for appropriate excision which will be coordinated with plastic surgery for reconstruction.\par \par All questions answered.\par \par They understand and would like to proceed with the operation, paperwork submitted.\par \par Note dictated\par \par \par 02-26-20.\par Summary note.\par I called him but had to leave a message on his machine.\par I called his daughter (Angelica) but had to leave a voicemail.\par On January 16, 2020, he had excision of a malignant spindle cell neoplasm from the left side of the scalp, with negative margins, and reconstruction by Dr. 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

## 2020-07-06 NOTE — H&P ADULT - NSTOBACCOSCREENHP_GEN_A_NCS
Problem: Pain  Goal: #Acceptable pain level achieved/maintained at rest using NRS/Faces  This goal is used for patients who can self-report.  Acceptable means the level is at or below the identified comfort/function goal.  Outcome: Outcome Met, Continue evaluating goal progress toward completion  Tylenol given for bilateral leg and low back pain. Lidocaine applied to right lower back.    Problem: Pressure Injury, Risk for  Goal: No new pressure injury (PI) development  Outcome: Outcome Met, Continue evaluating goal progress toward completion  Red flat scattered petechiae looking areas to elbows, low back, and tops of feet. Pt denies itching or pain.        No

## 2020-07-22 PROBLEM — Z86.79 HISTORY OF ESSENTIAL HYPERTENSION: Status: RESOLVED | Noted: 2020-01-01 | Resolved: 2020-01-01

## 2020-07-22 PROBLEM — Z82.0 FAMILY HISTORY OF ALZHEIMER'S DISEASE: Status: ACTIVE | Noted: 2020-01-01

## 2020-07-22 PROBLEM — Z86.79 HISTORY OF ATRIAL FIBRILLATION: Status: RESOLVED | Noted: 2020-01-01 | Resolved: 2020-01-01

## 2020-07-22 PROBLEM — Z86.59 HISTORY OF DEPRESSION: Status: RESOLVED | Noted: 2020-01-01 | Resolved: 2020-01-01

## 2020-07-22 PROBLEM — Z87.891 FORMER SMOKER: Status: ACTIVE | Noted: 2020-01-01

## 2020-07-22 PROBLEM — Z86.79 HISTORY OF CARDIAC DISORDER: Status: RESOLVED | Noted: 2020-01-01 | Resolved: 2020-01-01

## 2020-07-22 PROBLEM — Z86.39 HISTORY OF DIABETES MELLITUS: Status: RESOLVED | Noted: 2020-01-01 | Resolved: 2020-01-01

## 2020-07-22 PROBLEM — Z80.9 FAMILY HISTORY OF MALIGNANT NEOPLASM: Status: ACTIVE | Noted: 2020-01-01

## 2020-07-22 NOTE — CONSULT LETTER
[Please see my note below.] : Please see my note below. [Courtesy Letter:] : I had the pleasure of seeing your patient, [unfilled], in my office today. [Dear  ___] : Dear  [unfilled], [Consult Closing:] : Thank you very much for allowing me to participate in the care of this patient.  If you have any questions, please do not hesitate to contact me. [Sincerely,] : Sincerely, [FreeTextEntry3] : Corwin Bojorquez MD.

## 2020-07-22 NOTE — PHYSICAL EXAM
[General Appearance - Alert] : alert [Oriented to Person] : oriented to person [Memory Remote] : remote memory was not impaired [Vocabulary] : adequate range of vocabulary [Total Score ___ / 30] : the patient achieved a score of [unfilled] /30 [Date / Time ___ / 5] : date / time [unfilled] / 5 [Place ___ / 5] : place [unfilled] / 5 [Serial Sevens ___/5] : serial sevens [unfilled] / 5 [Registration ___ / 3] : registration [unfilled] / 3 [Naming 2 Objects ___ / 2] : naming two objects [unfilled] / 2 [Repeating a Sentence ___ / 1] : repeating a sentence [unfilled] / 1 [3-stage Verbal Command ___ / 3] : three-stage verbal command [unfilled] / 3 [Writing a Sentence ___ / 1] : write sentence [unfilled] / 1 [Written Command ___ / 1] : written command [unfilled] / 1 [Recall ___ / 3] : recall [unfilled] / 3 [Copy a Design ___ / 1] : copy a design [unfilled] / 1 [Cranial Nerves Optic (II)] : visual acuity intact bilaterally,  visual fields full to confrontation, pupils equal round and reactive to light [Cranial Nerves Oculomotor (III)] : extraocular motion intact [Cranial Nerves Vestibulocochlear (VIII)] : hearing was intact bilaterally [Cranial Nerves Facial (VII)] : face symmetrical [Cranial Nerves Trigeminal (V)] : facial sensation intact symmetrically [Cranial Nerves Accessory (XI - Cranial And Spinal)] : head turning and shoulder shrug symmetric [Cranial Nerves Hypoglossal (XII)] : there was no tongue deviation with protrusion [Cranial Nerves Glossopharyngeal (IX)] : tongue and palate midline [Motor Strength] : muscle strength was normal in all four extremities [Motor Tone] : muscle tone was normal in all four extremities [Sensation Pain / Temperature Decrease] : pain and temperature was intact [Sensation Tactile Decrease] : light touch was intact [Limited Balance] : the patient's balance was impaired [1+] : Ankle jerk left 1+ [Edema] : there was no peripheral edema [Optic Disc Abnormality] : the optic disc were normal in size and color [Involuntary Movements] : no involuntary movements were seen [Oriented to Time] : disoriented to time [Oriented to Place] : disoriented to place [Dysarthria] : no dysarthria [Aphasia] : no dysphasia/aphasia [Current Events] : inadequate knowledge of current events [Coordination - Dysmetria Impaired Finger-to-Nose Bilateral] : not present [Plantar Reflex Right Only] : normal on the right [Plantar Reflex Left Only] : normal on the left [FreeTextEntry8] : He is able to ambulate short distances.\par He presented in a wheelchair today.

## 2020-07-22 NOTE — HISTORY OF PRESENT ILLNESS
[FreeTextEntry1] : I saw this patient in the office today.\par He presents with his daughter.\par \par He has been having memory difficulties since early 2019.\par He underwent surgery for skin cancer in early 2020, and the memory has been worse since then.\par It is with him on a daily basis.\par Short-term memory is more affected than long-term memory.\par He does have a history of some depression as well.\par \par

## 2020-07-22 NOTE — ASSESSMENT
[FreeTextEntry1] : This is a 79-year-old man with what appears to be Alzheimer's type dementia.\par I will plan an MRI of the brain to evaluate for structural pathology.\par I will obtain an EEG to look for epileptiform abnormalities.\par I will obtain some blood tests to look for personal causes of memory loss.\par \par I would recommend a trial of Namenda starting at 5 mg.\par I will titrate to 10 mg twice per day.\par \par Blood pressure was elevated in my office today.\par I advised his daughter to discuss this with you further.\par \par I will see him back in a few months.\par

## 2020-10-01 PROBLEM — E11.40 NEUROPATHY, DIABETIC: Status: ACTIVE | Noted: 2020-01-01

## 2020-10-01 NOTE — DATA REVIEWED
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 [de-identified] : EEG was normal.B12 level was 238\par thyroid function was normal. [de-identified] : B12 level was 238.\par Thyroid function was normal.

## 2020-10-01 NOTE — CONSULT LETTER
[Dear  ___] : Dear  [unfilled], [Courtesy Letter:] : I had the pleasure of seeing your patient, [unfilled], in my office today. [Please see my note below.] : Please see my note below. [Consult Closing:] : Thank you very much for allowing me to participate in the care of this patient.  If you have any questions, please do not hesitate to contact me. [Sincerely,] : Sincerely, [FreeTextEntry3] : Corwin Bojorquez MD.

## 2020-10-01 NOTE — PHYSICAL EXAM
[General Appearance - Alert] : alert [Memory Remote] : remote memory was not impaired [Oriented to Person] : oriented to person [Vocabulary] : adequate range of vocabulary [Recall ___ / 3] : recall [unfilled] / 3 [Cranial Nerves Optic (II)] : visual acuity intact bilaterally,  visual fields full to confrontation, pupils equal round and reactive to light [Cranial Nerves Oculomotor (III)] : extraocular motion intact [Cranial Nerves Trigeminal (V)] : facial sensation intact symmetrically [Cranial Nerves Facial (VII)] : face symmetrical [Cranial Nerves Vestibulocochlear (VIII)] : hearing was intact bilaterally [Cranial Nerves Glossopharyngeal (IX)] : tongue and palate midline [Cranial Nerves Accessory (XI - Cranial And Spinal)] : head turning and shoulder shrug symmetric [Cranial Nerves Hypoglossal (XII)] : there was no tongue deviation with protrusion [Motor Tone] : muscle tone was normal in all four extremities [Motor Strength] : muscle strength was normal in all four extremities [Sensation Tactile Decrease] : light touch was intact [Sensation Pain / Temperature Decrease] : pain and temperature was intact [Limited Balance] : the patient's balance was impaired [1+] : Patella left 1+ [Optic Disc Abnormality] : the optic disc were normal in size and color [Edema] : there was no peripheral edema [Involuntary Movements] : no involuntary movements were seen [Oriented to Place] : disoriented to place [Oriented to Time] : disoriented to time [Dysarthria] : no dysarthria [Aphasia] : no dysphasia/aphasia [Current Events] : inadequate knowledge of current events [Coordination - Dysmetria Impaired Finger-to-Nose Bilateral] : not present [Plantar Reflex Right Only] : normal on the right [Plantar Reflex Left Only] : normal on the left [FreeTextEntry8] : He is able to ambulate short distances.\par He presented in a wheelchair today.

## 2020-10-01 NOTE — ASSESSMENT
[FreeTextEntry1] : This is a 79-year-old man with what appears to be Alzheimer's type dementia.\par \par I had started him on Namenda. \par \par He has also been having balance difficulty.\par He may have some diabetic peripheral neuropathy causing this.\par We will obtain EMG and nerve conduction studies to assess this further.\par \par I will see him back in 3-4 months.\par

## 2020-10-01 NOTE — HISTORY OF PRESENT ILLNESS
[FreeTextEntry1] : I saw this patient in the office today.\par He presents with his daughter.\par \par As you recall, he has been having memory difficulties since early 2019.\par He underwent surgery for skin cancer in early 2020, and the memory has been worse since then.\par It is with him on a daily basis.\par Short-term memory is more affected than long-term memory.\par He does have a history of some depression as well.\par \par I had started him on Namenda.\par \par He has also been having balance difficulty.\par This has been going on for the past year or so.\par I had recommended EMG. He has not had done.\par

## 2020-12-15 ENCOUNTER — APPOINTMENT (OUTPATIENT)
Dept: ORTHOPEDIC SURGERY | Facility: CLINIC | Age: 80
End: 2020-12-15

## 2020-12-31 PROBLEM — G30.9 ALZHEIMER'S DEMENTIA: Status: ACTIVE | Noted: 2020-01-01

## 2021-02-24 ENCOUNTER — APPOINTMENT (OUTPATIENT)
Dept: NEUROLOGY | Facility: CLINIC | Age: 81
End: 2021-02-24

## 2022-01-04 NOTE — DISCHARGE NOTE PROVIDER - NSDCCAREPROVSEEN_GEN_ALL_CORE_FT
Traumatic head injury with multiple lacerations Zhane, Emilee Chavez, Arden  [facility] Medicine, Cardiology Consult  [facility] Medicine, Advance PracticeTeam

## 2022-01-11 NOTE — ED PROVIDER NOTE - PROGRESS NOTE DETAILS
Group Therapy Note    Date: 1/11/2022    Group Start Time: 1345  Group End Time: 2189  Group Topic: Healthy Living/Wellness    [id number]    Lynette Peñaloza        Group Therapy Note    Attendees: 8       Patient's Goal:  To attend group    Notes:  Pt was attentive     Status After Intervention:  Unchanged    Participation Level: Interactive    Participation Quality: Attentive      Speech:  normal      Thought Process/Content: Logical      Affective Functioning: Congruent      Mood: calm      Level of consciousness:  Alert      Response to Learning: Able to verbalize current knowledge/experience      Endings: None Reported    Modes of Intervention: Activity      Discipline Responsible: PCA      Signature:   Toni Quijano Elizabeth Valdez MD pt signed out to me pending CT of the chest and head 2/2 fall. pt is on thinners,   given recent diagnosis of rib fx at urgent care. pt is currently comfortably resting in bed w/ no distress. Elizabeth Valdez MD spoke w/ trauma resident; will be down to evaluate the patient, pt is hd stable, not hypoxic, on RA

## 2023-04-13 NOTE — ASU PREOPERATIVE ASSESSMENT, ADULT ([facility] ONLY) - TEACHING/LEARNING FACTORS IMPACT ABILITY TO LEARN
Transplant Surgery  Operative Note     Procedure date:  04/13/23    Preoperative diagnosis:  Chronic renal failure due to IgA nephropathy    Postoperative diagnosis:  Same    Procedure:  1. Left kidney  Re- transplant,  Living, Left iliac fossa, without vascular reconstruction. A J-J ureteral stent was not placed.  2. Kidney allograft preparation on Back Table    Surgeon:  ELLIE TOMAS    Fellow/Assistant:  Fransico Bautista, fellow,  There was no qualified resident to assist with this procedure.    Anesthesia:  General    Specimen:  None    Drains:  no drain    Urine output:  100mls    Estimated blood loss:  50    Fluids administered:       Indication: The patient has Chronic renal failure due to IgA nephropathy and received an organ offer for a Living kidney allograft. After discussing the risks and benefits of proceeding, the patient agreed to proceed with surgery and provided informed consent.  Findings: Integrity of recipient artery: Normal  Intraoperative Events: none,     Final ABO/Crossmatch verification: After the donor organ arrived to the operating room and prior to anastomosis, I participated in the transplant pre-verification upon organ receipt timeout by visually verifying the donor ID, organ and laterality, donor blood type, recipient unique identifier, recipient blood type, and that the donor and recipient are blood type compatible. The crossmatch was done prospectively; the T cell flow crossmatch result was negative and B cell flow crossmatch result was negative prior to anastomosis.  The patient received Thymoglobulin, Cellcept and Solumedrol on induction.    Donor Organ Information:   Donor UNOS ID:  [id number]    Donor arterial clamp on:  4/13/2023  9:39 AM    Total ischemic time:  434 min    Cold ischemic time:  415 min    Warm ischemic time:  19 min    Preservation fluid:        Back Table Details:   Procedure:  Bench preparation of the kidney allograft for transplantation without vascular  reconstruction    Surgeon:  ELLIE TOMAS    Faculty Co-Surgeon:  ELLIE TOMAS    Fellow/Assistant:  Fransico Bautista fellow,      Donor arrival to recipient room:  4/13/2023  4:16 PM    Graft injury:  No    Graft biopsy:      Organ received on:  Ice    Pump resistance:      Pump flow:      Arterial anatomy:  Single    Donor arterial quality:  Normal    Venous anatomy:  Single    Ureteral anatomy:  Single    Any reconstruction:  No    Artery:      Vein:       Complications: None.    Findings: Normal       None.    Back Table Preparation:  The donor kidney was received and inspected. It had been flushed with UW. The graft was prepared on the back table by removing perinephric fat and ligating venous tributaries and lymphatics. The ureter was also cleaned of excess tissue. If required, reconstruction was performed as detailed above. The kidney was stored in iced cold preservation solution until ready for transplantation. Faculty was present for the critical portions of the procedure.    Operative Procedure:   Arterial anastomosis start:  4/13/2023  4:34 PM    Arterial unclamp:  4/13/2023  4:53 PM    Extra vessels used:         The patient was brought to the operating room, placed in a supine position, and a time out was performed. Sequential compression devices were placed on both lower extremities and general endotracheal anesthesia was induced.  The patient was given IV antibiotics and a Wharton catheter. A central line was placed by Anesthesia service. The abdomen was then shaved, prepped, and draped in the usual sterile fashion.  An incision was made in the right lower quadrant and carried down through the subcutaneous tissue and the abdominal wall fascia. If encountered, the epigastric vessels were ligated in continuity, divided and secured with surgical clips. The left iliac artery and vein were exposed. The retractor system was placed and the lymphatics overlying the vessels were serially ligated and  divided.     The patient was heparinized. We applied atraumatic vascular clamps and the donor kidney was brought to the operative field. We made a venotomy and the renal vein was anastomosed to the recipient left External Iliac vein in an end-to-side fashion. An arteriotomy was made and the donor renal artery was anastomosed to the recipient left external iliac artery  in an end to side fashion. The patient was simultaneously loaded with IV mannitol, Lasix and volume. The renal artery was protected and the clamps were removed. After several cardiac cycles, we opened the renal artery and the kidney had Good reperfusion and was soft, without edema and without congestion.    The transplant ureter was managed by creating a Liche (anterior multistitch) anastomosis with absorbable suture. No The kidney made Yes urine prior to implantation.    Hemostasis was obtained, the anastomoses inspected, and the kidney placed in the iliac fossa. After placement, the vessel lay was inspected and found to be acceptable. The kidney position was Retroperitoneal. The field was irrigated with antibiotic solution. No drain was placed. The retractor was removed and the abdominal wall fascia reapproximated. Subcutaneous tissues were irrigated and hemostasis obtained.  The skin was reapproximated with running subcuticular stitch and dermabond was applied.   All needle, sponge and instrument counts were correct x 2. The patient was awakened, extubated, and transferred to PACU for post-op monitoring. Faculty was present for key portions of the procedure.    I was present during the key portions of the procedure, and I was immediately available for the entire procedure. There was no qualified resident available to assist with the entire procedure. The fellow noted above participated as the first assistant in all parts of the dictated procedure and was the primary in the opening and closure with assistance from me as needed.             none

## 2024-02-21 NOTE — PROGRESS NOTE ADULT - PROBLEM SELECTOR PROBLEM 2
Detail Level: Generalized
Diabetes mellitus
Include Location In Plan?: No

## 2024-03-22 NOTE — H&P PST ADULT - MALLAMPATI CLASS
PATIENT NEEDS PCP APPT FOR FURTHER REFILLS   Class II - visualization of the soft palate, fauces, and uvula

## 2024-11-19 NOTE — PROVIDER CONTACT NOTE (OTHER) - ACTION/TREATMENT ORDERED:
NP made aware. Hypoglycemic protocol initiated. FS up to 143 after following protocol. Continue to monitor patient.
LEON Hernandez made aware. Hydralazine IVP ordered stat. Recheck BP 1 hr post hydralazine administration. Continue to monitor and maintain safety.
No